# Patient Record
Sex: FEMALE | Race: WHITE | NOT HISPANIC OR LATINO | Employment: OTHER | URBAN - METROPOLITAN AREA
[De-identification: names, ages, dates, MRNs, and addresses within clinical notes are randomized per-mention and may not be internally consistent; named-entity substitution may affect disease eponyms.]

---

## 2017-08-01 ENCOUNTER — HOSPITAL ENCOUNTER (OUTPATIENT)
Dept: PHYSICAL THERAPY | Facility: CLINIC | Age: 82
Setting detail: THERAPIES SERIES
End: 2017-08-01
Attending: FAMILY MEDICINE
Payer: MEDICARE

## 2017-08-01 PROCEDURE — G8982 BODY POS GOAL STATUS: HCPCS | Mod: GP,CI | Performed by: PHYSICAL THERAPIST

## 2017-08-01 PROCEDURE — G8981 BODY POS CURRENT STATUS: HCPCS | Mod: GP,CK | Performed by: PHYSICAL THERAPIST

## 2017-08-01 PROCEDURE — 97110 THERAPEUTIC EXERCISES: CPT | Mod: GP | Performed by: PHYSICAL THERAPIST

## 2017-08-01 PROCEDURE — 40000718 ZZHC STATISTIC PT DEPARTMENT ORTHO VISIT: Performed by: PHYSICAL THERAPIST

## 2017-08-01 PROCEDURE — 97140 MANUAL THERAPY 1/> REGIONS: CPT | Mod: GP | Performed by: PHYSICAL THERAPIST

## 2017-08-01 PROCEDURE — 97162 PT EVAL MOD COMPLEX 30 MIN: CPT | Mod: GP | Performed by: PHYSICAL THERAPIST

## 2017-08-02 NOTE — PROGRESS NOTES
[facility]      OUTPATIENT PHYSICAL THERAPY ORTHOPEDIC EVALUATION  PLAN OF TREATMENT FOR OUTPATIENT REHABILITATION  (COMPLETE FOR INITIAL CLAIMS ONLY)  Patient's Last Name, First Name, Irena Solorzano       Provider s Name:  [facility]   Medical Record No.  [id number]   Start of Care Date:  08/01/17   Onset Date:  06/01/17 (Recently returned/worse, ongoing 40 yrs)   Type:     _X__PT   ___OT   ___SLP Medical Diagnosis:  (P) Neck pain, headaches     PT Diagnosis:  Cervicogenic headaches, multifactorial, reduced ROM, joint mobility   Visits from SOC:  1   Therapist assessment:   _________________________________________________________________________________  Plan of Treatment/Functional Goals:  manual therapy, joint mobilization, neuromuscular re-education, strengthening, stretching, ROM        As needed for pain relief  Goals  Goal Identifier: 1  Goal Description: Patient will improve ROM to > 50% limited all directions cervical spine to better relieve cervicogenic headache referral   Target Date: 09/26/17    Goal Identifier: 2  Goal Description: Patient will report 50 % improve tightness in head and neck to reduce headaches during ADLs in 8 weeks  Target Date: 09/26/17    Goal Identifier: 3  Goal Description: Patient will score < 20% on NDI to improve tolerance to reading, ADLs and self care activities.                Therapy Frequency:  2 times/Week  Predicted Duration of Therapy Intervention:  10 weeks    Evelyn Luna PT                 I CERTIFY THE NEED FOR THESE SERVICES FURNISHED UNDER        THIS PLAN OF TREATMENT AND WHILE UNDER MY CARE .             Physician Signature               Date    X_____________________________________________________                               Certification Date From:  08/01/17   Certification Date To:  10/11/17    Referring Provider:  Dr Enrique Tran    Initial Assessment        See Epic Evaluation Start of Care Date: 08/01/17              Evelyn Heath................... PT, DPT, CLT   8/2/2017, 7:57 AM  [phone]
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
## 2017-08-07 ENCOUNTER — HOSPITAL ENCOUNTER (OUTPATIENT)
Dept: PHYSICAL THERAPY | Facility: CLINIC | Age: 82
Setting detail: THERAPIES SERIES
End: 2017-08-07
Attending: FAMILY MEDICINE
Payer: MEDICARE

## 2017-08-07 PROCEDURE — 97140 MANUAL THERAPY 1/> REGIONS: CPT | Mod: GP | Performed by: PHYSICAL THERAPIST

## 2017-08-07 PROCEDURE — 40000718 ZZHC STATISTIC PT DEPARTMENT ORTHO VISIT: Performed by: PHYSICAL THERAPIST

## 2017-08-07 PROCEDURE — 97530 THERAPEUTIC ACTIVITIES: CPT | Mod: GP,59 | Performed by: PHYSICAL THERAPIST

## 2017-08-09 ENCOUNTER — HOSPITAL ENCOUNTER (OUTPATIENT)
Dept: PHYSICAL THERAPY | Facility: CLINIC | Age: 82
Setting detail: THERAPIES SERIES
End: 2017-08-09
Attending: FAMILY MEDICINE
Payer: MEDICARE

## 2017-08-09 PROCEDURE — 97140 MANUAL THERAPY 1/> REGIONS: CPT | Mod: GP | Performed by: PHYSICAL THERAPIST

## 2017-08-09 PROCEDURE — 97530 THERAPEUTIC ACTIVITIES: CPT | Mod: GP | Performed by: PHYSICAL THERAPIST

## 2017-08-09 PROCEDURE — 40000718 ZZHC STATISTIC PT DEPARTMENT ORTHO VISIT: Performed by: PHYSICAL THERAPIST

## 2017-08-09 PROCEDURE — 40000449 ZZHC STATISTIC PT VISIT, LYMPHEDEMA: Performed by: PHYSICAL THERAPIST

## 2017-08-15 ENCOUNTER — HOSPITAL ENCOUNTER (OUTPATIENT)
Dept: PHYSICAL THERAPY | Facility: CLINIC | Age: 82
Setting detail: THERAPIES SERIES
End: 2017-08-15
Attending: FAMILY MEDICINE
Payer: MEDICARE

## 2017-08-15 PROCEDURE — 97140 MANUAL THERAPY 1/> REGIONS: CPT | Mod: GP | Performed by: PHYSICAL THERAPIST

## 2017-08-15 PROCEDURE — 40000718 ZZHC STATISTIC PT DEPARTMENT ORTHO VISIT: Performed by: PHYSICAL THERAPIST

## 2017-08-18 ENCOUNTER — HOSPITAL ENCOUNTER (OUTPATIENT)
Dept: PHYSICAL THERAPY | Facility: CLINIC | Age: 82
Setting detail: THERAPIES SERIES
End: 2017-08-18
Attending: FAMILY MEDICINE
Payer: MEDICARE

## 2017-08-18 PROCEDURE — 97140 MANUAL THERAPY 1/> REGIONS: CPT | Mod: GP | Performed by: PHYSICAL THERAPIST

## 2017-08-18 PROCEDURE — 40000718 ZZHC STATISTIC PT DEPARTMENT ORTHO VISIT: Performed by: PHYSICAL THERAPIST

## 2017-08-21 ENCOUNTER — HOSPITAL ENCOUNTER (OUTPATIENT)
Dept: PHYSICAL THERAPY | Facility: CLINIC | Age: 82
Setting detail: THERAPIES SERIES
End: 2017-08-21
Attending: FAMILY MEDICINE
Payer: MEDICARE

## 2017-08-21 PROCEDURE — 97140 MANUAL THERAPY 1/> REGIONS: CPT | Mod: GP | Performed by: PHYSICAL THERAPIST

## 2017-08-21 PROCEDURE — 40000718 ZZHC STATISTIC PT DEPARTMENT ORTHO VISIT: Performed by: PHYSICAL THERAPIST

## 2017-08-23 ENCOUNTER — HOSPITAL ENCOUNTER (OUTPATIENT)
Dept: PHYSICAL THERAPY | Facility: CLINIC | Age: 82
Setting detail: THERAPIES SERIES
End: 2017-08-23
Attending: FAMILY MEDICINE
Payer: MEDICARE

## 2017-08-23 PROCEDURE — 40000718 ZZHC STATISTIC PT DEPARTMENT ORTHO VISIT: Performed by: PHYSICAL THERAPIST

## 2017-08-23 PROCEDURE — 97140 MANUAL THERAPY 1/> REGIONS: CPT | Mod: GP | Performed by: PHYSICAL THERAPIST

## 2017-09-12 ENCOUNTER — HOSPITAL ENCOUNTER (OUTPATIENT)
Dept: PHYSICAL THERAPY | Facility: CLINIC | Age: 82
Setting detail: THERAPIES SERIES
End: 2017-09-12
Attending: FAMILY MEDICINE
Payer: MEDICARE

## 2017-09-12 PROCEDURE — 97140 MANUAL THERAPY 1/> REGIONS: CPT | Mod: GP | Performed by: PHYSICAL THERAPIST

## 2017-09-12 PROCEDURE — 40000718 ZZHC STATISTIC PT DEPARTMENT ORTHO VISIT: Performed by: PHYSICAL THERAPIST

## 2017-09-14 ENCOUNTER — HOSPITAL ENCOUNTER (OUTPATIENT)
Dept: PHYSICAL THERAPY | Facility: CLINIC | Age: 82
Setting detail: THERAPIES SERIES
End: 2017-09-14
Attending: FAMILY MEDICINE
Payer: MEDICARE

## 2017-09-14 PROCEDURE — 97140 MANUAL THERAPY 1/> REGIONS: CPT | Mod: GP | Performed by: PHYSICAL THERAPIST

## 2017-09-14 PROCEDURE — 40000718 ZZHC STATISTIC PT DEPARTMENT ORTHO VISIT: Performed by: PHYSICAL THERAPIST

## 2017-09-19 ENCOUNTER — HOSPITAL ENCOUNTER (OUTPATIENT)
Dept: PHYSICAL THERAPY | Facility: CLINIC | Age: 82
Setting detail: THERAPIES SERIES
End: 2017-09-19
Attending: FAMILY MEDICINE
Payer: MEDICARE

## 2017-09-19 PROCEDURE — 97140 MANUAL THERAPY 1/> REGIONS: CPT | Mod: GP | Performed by: PHYSICAL THERAPIST

## 2017-09-19 PROCEDURE — G8981 BODY POS CURRENT STATUS: HCPCS | Mod: GP,CJ | Performed by: PHYSICAL THERAPIST

## 2017-09-19 PROCEDURE — 40000718 ZZHC STATISTIC PT DEPARTMENT ORTHO VISIT: Performed by: PHYSICAL THERAPIST

## 2017-09-19 PROCEDURE — G8982 BODY POS GOAL STATUS: HCPCS | Mod: GP,CI | Performed by: PHYSICAL THERAPIST

## 2017-09-19 NOTE — PROGRESS NOTES
Outpatient Physical Therapy Progress Note     Patient: Irena Hand  : 1932    Beginning/End Dates of Reporting Period:  17 to 2017    Referring Provider: Dr. Enrique Tran    Therapy Diagnosis: HA, neck pain, back pain     Client Self Report: HA #0, TL junction pain #3, has gone 4 days no HA.     Objective Measurements:        Objective Measure: NDI  Details: 15.56%         Goals:  Goal Identifier 1   Goal Description Patient will improve ROM to > 50% limited all directions cervical spine to better relieve cervicogenic headache referral  (goal met)   Target Date     Date Met  17   Progress:     Goal Identifier 2   Goal Description Patient will report 50 % improve tightness in head and neck to reduce headaches during ADLs in 8 weeks (goal met)   Target Date     Date Met  17   Progress:     Goal Identifier 3   Goal Description Patient will score < 20% on NDI to improve tolerance to reading, ADLs and self care activities.  (goal met)   Target Date     Date Met  17   Progress:     Goal Identifier 4   Goal Description Patient has gone 2 consecutive wks with no HA's so she can cont her activities without pain   Target Date 17   Date Met      Progress:                                      New goal       Progress Toward Goals:   Progress this reporting period: Patient is doing better and meeting goals. Patient has gone 4 days in a row with no HA. Was able to go to a [facility] retreat with no HA.         Plan:  Continue therapy per current plan of care.    Discharge:  No    Thank you for the referral,            Anna Amaya PT

## 2017-09-27 ENCOUNTER — HOSPITAL ENCOUNTER (OUTPATIENT)
Dept: PHYSICAL THERAPY | Facility: OTHER | Age: 82
Setting detail: THERAPIES SERIES
End: 2017-09-27
Attending: FAMILY MEDICINE
Payer: MEDICARE

## 2017-09-27 PROCEDURE — 97140 MANUAL THERAPY 1/> REGIONS: CPT | Mod: GP | Performed by: PHYSICAL THERAPIST

## 2017-09-27 PROCEDURE — 40000718 ZZHC STATISTIC PT DEPARTMENT ORTHO VISIT: Performed by: PHYSICAL THERAPIST

## 2017-10-04 ENCOUNTER — HOSPITAL ENCOUNTER (OUTPATIENT)
Dept: PHYSICAL THERAPY | Facility: OTHER | Age: 82
Setting detail: THERAPIES SERIES
End: 2017-10-04
Attending: FAMILY MEDICINE
Payer: MEDICARE

## 2017-10-04 PROCEDURE — 97140 MANUAL THERAPY 1/> REGIONS: CPT | Mod: GP | Performed by: PHYSICAL THERAPIST

## 2017-10-04 PROCEDURE — 40000718 ZZHC STATISTIC PT DEPARTMENT ORTHO VISIT: Performed by: PHYSICAL THERAPIST

## 2017-10-09 ENCOUNTER — HOSPITAL ENCOUNTER (OUTPATIENT)
Dept: PHYSICAL THERAPY | Facility: OTHER | Age: 82
Setting detail: THERAPIES SERIES
End: 2017-10-09
Attending: FAMILY MEDICINE
Payer: MEDICARE

## 2017-10-09 PROCEDURE — 97140 MANUAL THERAPY 1/> REGIONS: CPT | Mod: GP | Performed by: PHYSICAL THERAPIST

## 2017-10-09 PROCEDURE — 40000718 ZZHC STATISTIC PT DEPARTMENT ORTHO VISIT: Performed by: PHYSICAL THERAPIST

## 2017-10-13 ENCOUNTER — HOSPITAL ENCOUNTER (OUTPATIENT)
Dept: PHYSICAL THERAPY | Facility: OTHER | Age: 82
Setting detail: THERAPIES SERIES
End: 2017-10-13
Attending: FAMILY MEDICINE
Payer: MEDICARE

## 2017-10-13 PROCEDURE — 97140 MANUAL THERAPY 1/> REGIONS: CPT | Mod: GP | Performed by: PHYSICAL THERAPIST

## 2017-10-13 PROCEDURE — 40000718 ZZHC STATISTIC PT DEPARTMENT ORTHO VISIT: Performed by: PHYSICAL THERAPIST

## 2017-10-13 PROCEDURE — 97530 THERAPEUTIC ACTIVITIES: CPT | Mod: GP | Performed by: PHYSICAL THERAPIST

## 2017-10-16 ENCOUNTER — HOSPITAL ENCOUNTER (OUTPATIENT)
Dept: PHYSICAL THERAPY | Facility: OTHER | Age: 82
Setting detail: THERAPIES SERIES
End: 2017-10-16
Attending: FAMILY MEDICINE
Payer: MEDICARE

## 2017-10-16 PROCEDURE — 97140 MANUAL THERAPY 1/> REGIONS: CPT | Mod: GP | Performed by: PHYSICAL THERAPIST

## 2017-10-16 PROCEDURE — 40000718 ZZHC STATISTIC PT DEPARTMENT ORTHO VISIT: Performed by: PHYSICAL THERAPIST

## 2017-10-18 ENCOUNTER — HOSPITAL ENCOUNTER (OUTPATIENT)
Dept: PHYSICAL THERAPY | Facility: OTHER | Age: 82
Setting detail: THERAPIES SERIES
End: 2017-10-18
Attending: FAMILY MEDICINE
Payer: MEDICARE

## 2017-10-18 PROCEDURE — 40000718 ZZHC STATISTIC PT DEPARTMENT ORTHO VISIT: Performed by: PHYSICAL THERAPIST

## 2017-10-18 PROCEDURE — 97140 MANUAL THERAPY 1/> REGIONS: CPT | Mod: GP | Performed by: PHYSICAL THERAPIST

## 2017-10-18 NOTE — PROGRESS NOTES
[facility]      OUTPATIENT PHYSICAL THERAPY  PLAN OF TREATMENT FOR OUTPATIENT REHABILITATION    Patient's Last Name, First Name, M.I.                YOB: 1932  Irena Hand                           Provider's Name  [facility] Medical Record No.  [id number]                               Onset Date: 6/1/17 (ongoing 40 yrs)   Start of Care Date: 8/1/17   Type:     _X_PT   ___OT   ___SLP Medical Diagnosis: neck pain, headaches                       PT Diagnosis: Cervicogenic headaches, mutifactorial, reduced ROM, joint mobility, trunk pain      _________________________________________________________________________________  Plan of Treatment:    Frequency/Duration: 2x per wk for 10 wks     Goals:  Goal Identifier 1   Goal Description Patient will improve ROM to > 50% limited all directions cervical spine to better relieve cervicogenic headache referral  (goal met)   Target Date     Date Met  09/19/17   Progress:     Goal Identifier 2   Goal Description Patient will report 50 % improve tightness in head and neck to reduce headaches during ADLs in 8 weeks (goal met)   Target Date     Date Met  09/19/17   Progress:     Goal Identifier 3   Goal Description Patient will score < 20% on NDI to improve tolerance to reading, ADLs and self care activities.  (goal met)   Target Date     Date Met  09/19/17   Progress:     Goal Identifier 4   Goal Description Patient has gone 2 consecutive wks with no HA's so she can cont her activities without pain (goal met)   Target Date     Date Met  10/18/17   Progress:     Goal Identifier 5   Goal Description Patient no longer has pain in the right lower rib cage with all activity   Target Date 12/19/18   Date Met      Progress:                                   New goal, has happened in the past that the HA improves and the trunk pain gets worse.         Progress Toward Goals:   Progress this reporting period: Patient is doing well now with the HA pain, goals above are met. However pt states that in the past she often will get trunk pain when the HA's are better, therefore working towards pain free the whole spine and trunk.             Certification date from 10/11/17 to 12/19/17.    Anna Amaya, PT          I CERTIFY THE NEED FOR THESE SERVICES FURNISHED UNDER        THIS PLAN OF TREATMENT AND WHILE UNDER MY CARE .             Physician Signature               Date    X_____________________________________________________                        Referring Provider: Dr.David Tran

## 2017-10-24 ENCOUNTER — HOSPITAL ENCOUNTER (OUTPATIENT)
Dept: PHYSICAL THERAPY | Facility: CLINIC | Age: 82
Setting detail: THERAPIES SERIES
End: 2017-10-24
Attending: FAMILY MEDICINE
Payer: MEDICARE

## 2017-10-24 PROCEDURE — 40000718 ZZHC STATISTIC PT DEPARTMENT ORTHO VISIT: Performed by: PHYSICAL THERAPIST

## 2017-10-24 PROCEDURE — 97530 THERAPEUTIC ACTIVITIES: CPT | Mod: GP,59 | Performed by: PHYSICAL THERAPIST

## 2017-10-24 PROCEDURE — 97140 MANUAL THERAPY 1/> REGIONS: CPT | Mod: GP | Performed by: PHYSICAL THERAPIST

## 2017-10-26 ENCOUNTER — HOSPITAL ENCOUNTER (OUTPATIENT)
Dept: PHYSICAL THERAPY | Facility: CLINIC | Age: 82
Setting detail: THERAPIES SERIES
End: 2017-10-26
Attending: FAMILY MEDICINE
Payer: MEDICARE

## 2017-10-26 PROCEDURE — 97112 NEUROMUSCULAR REEDUCATION: CPT | Mod: GP | Performed by: PHYSICAL THERAPIST

## 2017-10-26 PROCEDURE — 40000718 ZZHC STATISTIC PT DEPARTMENT ORTHO VISIT: Performed by: PHYSICAL THERAPIST

## 2017-10-26 PROCEDURE — 97140 MANUAL THERAPY 1/> REGIONS: CPT | Mod: GP | Performed by: PHYSICAL THERAPIST

## 2017-10-26 NOTE — ADDENDUM NOTE
Encounter addended by: Anna Amaya PT on: 10/26/2017  8:26 AM<BR>     Actions taken: Pend clinical note

## 2017-10-31 ENCOUNTER — HOSPITAL ENCOUNTER (OUTPATIENT)
Dept: PHYSICAL THERAPY | Facility: CLINIC | Age: 82
Setting detail: THERAPIES SERIES
End: 2017-10-31
Attending: FAMILY MEDICINE
Payer: MEDICARE

## 2017-10-31 PROCEDURE — 40000718 ZZHC STATISTIC PT DEPARTMENT ORTHO VISIT: Performed by: PHYSICAL THERAPIST

## 2017-10-31 PROCEDURE — 97110 THERAPEUTIC EXERCISES: CPT | Mod: GP | Performed by: PHYSICAL THERAPIST

## 2017-11-07 ENCOUNTER — HOSPITAL ENCOUNTER (OUTPATIENT)
Dept: PHYSICAL THERAPY | Facility: CLINIC | Age: 82
Setting detail: THERAPIES SERIES
End: 2017-11-07
Attending: FAMILY MEDICINE
Payer: MEDICARE

## 2017-11-07 PROCEDURE — 97110 THERAPEUTIC EXERCISES: CPT | Mod: GP | Performed by: PHYSICAL THERAPIST

## 2017-11-07 PROCEDURE — 97140 MANUAL THERAPY 1/> REGIONS: CPT | Mod: GP | Performed by: PHYSICAL THERAPIST

## 2017-11-07 PROCEDURE — 40000718 ZZHC STATISTIC PT DEPARTMENT ORTHO VISIT: Performed by: PHYSICAL THERAPIST

## 2017-11-13 ENCOUNTER — HOSPITAL ENCOUNTER (OUTPATIENT)
Dept: PHYSICAL THERAPY | Facility: OTHER | Age: 82
Setting detail: THERAPIES SERIES
End: 2017-11-13
Attending: FAMILY MEDICINE
Payer: MEDICARE

## 2017-11-13 PROCEDURE — 97140 MANUAL THERAPY 1/> REGIONS: CPT | Mod: GP | Performed by: PHYSICAL THERAPIST

## 2017-11-13 PROCEDURE — 40000718 ZZHC STATISTIC PT DEPARTMENT ORTHO VISIT: Performed by: PHYSICAL THERAPIST

## 2017-11-15 ENCOUNTER — HOSPITAL ENCOUNTER (OUTPATIENT)
Dept: PHYSICAL THERAPY | Facility: OTHER | Age: 82
Setting detail: THERAPIES SERIES
End: 2017-11-15
Attending: FAMILY MEDICINE
Payer: MEDICARE

## 2017-11-15 PROCEDURE — 40000718 ZZHC STATISTIC PT DEPARTMENT ORTHO VISIT: Performed by: PHYSICAL THERAPIST

## 2017-11-15 PROCEDURE — 97140 MANUAL THERAPY 1/> REGIONS: CPT | Mod: GP | Performed by: PHYSICAL THERAPIST

## 2017-11-21 ENCOUNTER — HOSPITAL ENCOUNTER (OUTPATIENT)
Dept: PHYSICAL THERAPY | Facility: CLINIC | Age: 82
Setting detail: THERAPIES SERIES
End: 2017-11-21
Attending: FAMILY MEDICINE
Payer: MEDICARE

## 2017-11-21 PROCEDURE — 97140 MANUAL THERAPY 1/> REGIONS: CPT | Mod: GP,KX | Performed by: PHYSICAL THERAPIST

## 2017-11-21 PROCEDURE — G8982 BODY POS GOAL STATUS: HCPCS | Mod: GP,CI | Performed by: PHYSICAL THERAPIST

## 2017-11-21 PROCEDURE — 40000718 ZZHC STATISTIC PT DEPARTMENT ORTHO VISIT: Performed by: PHYSICAL THERAPIST

## 2017-11-21 PROCEDURE — G8983 BODY POS D/C STATUS: HCPCS | Mod: GP,CI | Performed by: PHYSICAL THERAPIST

## 2017-11-21 NOTE — PROGRESS NOTES
Outpatient Physical Therapy Discharge Note     Patient: Irena Hand  : 1932    Beginning/End Dates of Reporting Period:  17 to 2017    Referring Provider: Dr.David Tran    Therapy Diagnosis: HA, rib pain     Client Self Report: Pain in the ribs is a #2-3, never a #0 and mostly on the right. Laying on the roll 1-2x per day. NO headaches for several weeks. Patient very pleased with her results from PT.      Objective Measurements:  Objective Measure: Pain in the thoracic and ribs  Details: #2-3 but hardly feel it            Goals:  Goal Identifier 1   Goal Description  (goal met)   Target Date     Date Met  17   Progress:     Goal Identifier 2   Goal Description  (goal met)   Target Date     Date Met  17   Progress:     Goal Identifier 3   Goal Description  (goal met)   Target Date     Date Met  17   Progress:     Goal Identifier 4   Goal Description Patient has gone 2 consecutive wks with no HA's so she can cont her activities without pain (goal met)   Target Date     Date Met  10/18/17   Progress:     Goal Identifier 5   Goal Description Patient no longer has pain in the right lower rib cage with all activity (75% better)   Target Date     Date Met      Progress:                                        75% better in the rib pain       Progress Toward Goals:   Progress this reporting period: Patient is doing very well and no HA's. Rib pain is a avg of a #2 and pt is no longer taking pain meds for this.         Plan:  Discharge from therapy.    Discharge:    Reason for Discharge: Patient has met all goals.      Discharge Plan: Patient to continue home program.    Thank you for the referral,            Anan Amaya PT

## 2021-06-08 ENCOUNTER — MEDICAL CORRESPONDENCE (OUTPATIENT)
Dept: HEALTH INFORMATION MANAGEMENT | Facility: CLINIC | Age: 86
End: 2021-06-08

## 2021-06-08 ENCOUNTER — TRANSFERRED RECORDS (OUTPATIENT)
Dept: HEALTH INFORMATION MANAGEMENT | Facility: CLINIC | Age: 86
End: 2021-06-08

## 2021-06-18 ENCOUNTER — TRANSFERRED RECORDS (OUTPATIENT)
Dept: HEALTH INFORMATION MANAGEMENT | Facility: CLINIC | Age: 86
End: 2021-06-18

## 2023-01-20 ENCOUNTER — TRANSFERRED RECORDS (OUTPATIENT)
Dept: HEALTH INFORMATION MANAGEMENT | Facility: CLINIC | Age: 88
End: 2023-01-20

## 2023-01-30 ENCOUNTER — MEDICAL CORRESPONDENCE (OUTPATIENT)
Dept: HEALTH INFORMATION MANAGEMENT | Facility: CLINIC | Age: 88
End: 2023-01-30

## 2023-07-24 PROBLEM — I48.20 CHRONIC ATRIAL FIBRILLATION (H): Status: ACTIVE | Noted: 2019-06-24

## 2023-07-24 PROBLEM — R31.29 MICROSCOPIC HEMATURIA: Status: ACTIVE | Noted: 2018-06-20

## 2023-07-24 PROBLEM — M18.11 PRIMARY OSTEOARTHRITIS OF FIRST CARPOMETACARPAL JOINT OF RIGHT HAND: Status: ACTIVE | Noted: 2017-01-18

## 2023-07-24 PROBLEM — D47.2 MONOCLONAL GAMMOPATHY: Status: ACTIVE | Noted: 2022-04-27

## 2023-07-24 PROBLEM — M47.817 LUMBOSACRAL SPONDYLOSIS WITHOUT MYELOPATHY: Status: ACTIVE | Noted: 2023-07-24

## 2023-07-24 PROBLEM — Z79.01 WARFARIN ANTICOAGULATION: Status: ACTIVE | Noted: 2022-08-10

## 2023-07-24 PROBLEM — S72.001A HIP FRACTURE, RIGHT, CLOSED, INITIAL ENCOUNTER (H): Status: ACTIVE | Noted: 2023-07-18

## 2023-07-24 PROBLEM — N32.81 OVERACTIVE BLADDER: Status: ACTIVE | Noted: 2020-07-07

## 2023-07-24 PROBLEM — Z79.01 ANTICOAGULATION MONITORING, INR RANGE 2-3: Status: ACTIVE | Noted: 2021-07-09

## 2023-07-24 PROBLEM — Z79.891 LONG TERM (CURRENT) USE OF OPIATE ANALGESIC: Status: ACTIVE | Noted: 2023-07-24

## 2023-07-24 PROBLEM — D64.9 ANEMIA OF UNKNOWN ETIOLOGY: Status: ACTIVE | Noted: 2021-04-26

## 2023-07-24 PROBLEM — K43.9 VENTRAL HERNIA WITHOUT OBSTRUCTION OR GANGRENE: Status: ACTIVE | Noted: 2023-07-24

## 2023-07-24 PROBLEM — G89.29 CHRONIC PAIN: Status: ACTIVE | Noted: 2023-07-24

## 2023-07-24 PROBLEM — M47.812 CERVICAL SPONDYLOSIS WITHOUT MYELOPATHY: Status: ACTIVE | Noted: 2023-07-24

## 2023-07-24 PROBLEM — M25.569 PAIN IN UNSPECIFIED KNEE: Status: ACTIVE | Noted: 2023-07-24

## 2023-07-24 PROBLEM — F11.20 OPIOID DEPENDENCE (H): Status: ACTIVE | Noted: 2023-07-24

## 2023-07-24 PROBLEM — M54.81 OCCIPITAL NEURALGIA: Status: ACTIVE | Noted: 2023-07-24

## 2023-07-24 PROBLEM — I50.32 CHRONIC DIASTOLIC CHF (CONGESTIVE HEART FAILURE) (H): Status: ACTIVE | Noted: 2021-04-26

## 2023-07-24 PROBLEM — M47.814 THORACIC SPONDYLOSIS WITHOUT MYELOPATHY: Status: ACTIVE | Noted: 2023-07-24

## 2023-07-24 PROBLEM — H53.2 DIPLOPIA: Status: ACTIVE | Noted: 2019-03-22

## 2023-07-24 PROBLEM — M60.9 MYOSITIS: Status: ACTIVE | Noted: 2023-07-24

## 2023-07-24 PROBLEM — R22.2 SUBCUTANEOUS MASS OF ABDOMINAL WALL: Status: ACTIVE | Noted: 2023-07-24

## 2023-07-24 PROBLEM — F40.231 FEAR OF INJECTIONS AND TRANSFUSIONS: Status: ACTIVE | Noted: 2023-07-24

## 2023-07-24 PROBLEM — K21.9 GASTROESOPHAGEAL REFLUX DISEASE WITHOUT ESOPHAGITIS: Status: ACTIVE | Noted: 2017-01-02

## 2023-07-24 PROBLEM — G62.9 PERIPHERAL NEUROPATHY: Status: ACTIVE | Noted: 2023-07-24

## 2023-07-24 PROBLEM — I10 PRIMARY HYPERTENSION: Status: ACTIVE | Noted: 2022-08-10

## 2023-07-24 PROBLEM — F33.9 DEPRESSION, RECURRENT (H): Status: ACTIVE | Noted: 2022-04-27

## 2023-07-24 NOTE — PROGRESS NOTES
[facility] GERIATRICS  Primary Care Provider & Clinic: [facility] COPE, None  Chief Complaint   Patient presents with    Hospital F/U     [facility] 7/18/2023 - 7/24/2023     Reserve Medical Record Number: [id number]  Place of Service Where Encounter Took Place: [facility] (TCU) [[id number]]    Irena Hand is a 89 year old (11/18/1932), admitted to the above facility from  [facility]. Hospital stay 7/18/23 through 7/24/23.    HPI:        From EHR:  Irena Hand is a 89 y.o. female with a history of Paroxysmal atrial fibrillation (HC) on coumadin, Chronic diastolic CHF (congestive heart failure), Anemia of unknown etiology, Idiopathic peripheral neuropathy, Monoclonal gammopathy, Primary hypertension, who presented to [facility] emergency department on 7/18/2023 for right hip pain after a mechanical fall at home[place] [facility] [place] x-rays showed a right femoral neck hip fracture, chest and right elbow x-ray were negative.   Underwent Rt DAA Hip hemiarthroplasty by Dr Vallejo om 7/20/23 without immediate complication.  Hgb dropped to 7.8 on 7/23, noted hematoma on right buttock.  Hgb up to 8.6 and INR 1.4 @ discharge.   Also experienced some confusion, thought to be 2/2 opiates.    Today, in TCU, patient continues to be somewhat confused.  Difficulty answering questions, poor historian, memory impaired.  Confirmed with nursing staff that this has been her norm since admission.  Patient attests to chronic pain, pain worse in surgical extremity but able to still participate in therapies.  Denies any issues with sleep, appetite, bowels or bladder.  Denies shortness of breath, chest pain, dizziness, lightheadedness.  Note patient is on both Prilosec and Pepcid.  She is uncertain why.  Denies any known history of GI bleeding or GERD.  Acid suppression should be sufficient with PPI    CODE STATUS/ADVANCE DIRECTIVES DISCUSSION: CPR/Full code   Patient's living  condition: lives alone  ALLERGIES:   Allergies   Allergen Reactions    Cats Unknown    Perfume Unknown    Smoke. Unknown     Cigarette smoke      PAST MEDICAL HISTORY: No past medical history on file.   PAST SURGICAL HISTORY:  has no past surgical history on file.  FAMILY HISTORY: family history includes Cancer in her sister; Diabetes in her mother; Hypertension in her brother; Unknown/Adopted in her daughter and son.  SOCIAL HISTORY:  reports that she has never smoked. She has never used smokeless tobacco.    Post Discharge Medication Reconciliation Status:  MED REC REQUIRED  Post Medication Reconciliation Status: discharge medications reconciled and changed, per note/orders    Current Outpatient Medications   Medication Sig    acetaminophen (TYLENOL) 500 MG tablet Take 500 mg by mouth 2 times daily    fluticasone (FLONASE) 50 MCG/ACT nasal spray Spray 2 sprays into both nostrils daily as needed for rhinitis or allergies    gabapentin (NEURONTIN) 100 MG capsule Take 100 mg by mouth daily 100 mg at bedtime scheduled  mg once daily PRN for neuropathy    losartan (COZAAR) 25 MG tablet Take 25 mg by mouth daily    morphine (MS CONTIN) 15 MG CR tablet Take 1 tablet (15 mg) by mouth At Bedtime    omeprazole (PRILOSEC OTC) 20 MG EC tablet Take 20 mg by mouth daily    ondansetron (ZOFRAN ODT) 4 MG ODT tab Take 4 mg by mouth every 8 hours as needed for nausea or vomiting    oxyCODONE-acetaminophen (PERCOCET) 5-325 MG tablet Take 0.5 tablets by mouth every 4 hours as needed for severe pain    polyethylene glycol (MIRALAX) 17 g packet Take 17 g by mouth daily Hold for loose stools    senna-docusate (SENOKOT-S/PERICOLACE) 8.6-50 MG tablet Take 1 tablet by mouth 2 times daily    WARFARIN SODIUM PO Take by mouth See Admin Instructions Dosing in coordination with INR results     No current facility-administered medications for this visit.     ROS:  10 point ROS of systems including Constitutional, Eyes, Respiratory,  "Cardiovascular, Gastroenterology, Genitourinary, Integumentary, Musculoskeletal, Psychiatric were all negative except for pertinent positives noted in my HPI.    Vitals:  /60   Pulse 96   Temp 98.2  F (36.8  C)   Resp 16   Ht 1.549 m (5' 1\")   Wt 53.5 kg (118 lb)   SpO2 96%   BMI 22.30 kg/m    Exam:  GENERAL APPEARANCE:  Alert, in no distress, cooperative, resting comfortably in bed after slightly painful transfer into bed  RESP:  lungs clear to auscultation , no respiratory distress  CV:  regular rate and rhythm, no murmur, rub, or gallop, no significant lower extremity edema or calf tenderness  ABDOMEN:  bowel sounds normal, soft, non-tender  M/S:   Generalized weakness.  Reduced range of motion right lower extremity status post surgery  SKIN:  Surgical dressing mostly intact.  No evidence of bleeding.  Edges are curling up.  Minimal bruising  NEURO:   NFD  PSYCH:  memory impaired , anxious    Lab/Diagnostic Data:  Recent labs in [facility] reviewed by me today.       Warfarin Doses DURING Hospital Admission:   warfarin was reversed with Vitamin K on 7/18 and 7/19 for surgical procedure performed on 7/20.   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  Up to 8.4 at hospital discharge  Can contribute to some weakness  -Follow hemoglobin " for stability    (I11.0) Hypertensive heart failure (H)  Appears compensated.  Blood pressure within acceptable limits the current medical regimen is effective;  continue present plan and medications.  Losartan      (F33.9) Depression, recurrent (H)  Per medical record.  Patient denies.  Not on any medication.  Given history, monitor    (I48.20) Chronic atrial fibrillation (H)  Regular rhythm.  Heart rate ranging from 50 to the 90s.  Warfarin had been held for surgery, resumed postoperatively on 7/20.  INRs have been subtherapeutic.  INR today 1.33.  It is not clear how much Coumadin she has been given in the last few days.  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  Has not required any as needed doses since TCU admission  -Continue PTA gabapentin 100 mg at at bedtime and 1 times daily as needed    (D47.2) Monoclonal gammopathy  Per medical record.  PCP to follow at TCU discharge    Orders:   Discontinue Famotidine  Coumadin 2 mg today and tomorrow.    Recheck INR 7/28    Total time spent with patient visit at the skilled nursing facility was 35 min including patient visit and review of past records. Greater than 50% of total time spent with counseling and coordinating care due to review of medical history, medication reconciliation, collaboration and coordination of cares with facility staff.     Electronically signed by: FRIDA Cisse CNP

## 2023-07-25 ENCOUNTER — LAB REQUISITION (OUTPATIENT)
Dept: LAB | Facility: CLINIC | Age: 88
End: 2023-07-25

## 2023-07-25 DIAGNOSIS — D64.9 ANEMIA, UNSPECIFIED: ICD-10-CM

## 2023-07-25 DIAGNOSIS — G89.29 CHRONIC PAIN: Primary | ICD-10-CM

## 2023-07-25 DIAGNOSIS — I48.91 UNSPECIFIED ATRIAL FIBRILLATION (H): ICD-10-CM

## 2023-07-25 RX ORDER — MORPHINE SULFATE 15 MG/1
15 TABLET, FILM COATED, EXTENDED RELEASE ORAL AT BEDTIME
Qty: 15 TABLET | Refills: 0 | Status: SHIPPED | OUTPATIENT
Start: 2023-07-25 | End: 2023-08-04

## 2023-07-25 RX ORDER — MORPHINE SULFATE 15 MG/1
15 TABLET, FILM COATED, EXTENDED RELEASE ORAL AT BEDTIME
COMMUNITY
End: 2023-07-25

## 2023-07-26 ENCOUNTER — TRANSITIONAL CARE UNIT VISIT (OUTPATIENT)
Dept: GERIATRICS | Facility: CLINIC | Age: 88
End: 2023-07-26
Payer: MEDICARE

## 2023-07-26 VITALS
HEART RATE: 96 BPM | TEMPERATURE: 98.2 F | SYSTOLIC BLOOD PRESSURE: 130 MMHG | OXYGEN SATURATION: 96 % | WEIGHT: 118 LBS | RESPIRATION RATE: 16 BRPM | DIASTOLIC BLOOD PRESSURE: 60 MMHG | HEIGHT: 61 IN | BODY MASS INDEX: 22.28 KG/M2

## 2023-07-26 DIAGNOSIS — I48.20 CHRONIC ATRIAL FIBRILLATION (H): ICD-10-CM

## 2023-07-26 DIAGNOSIS — D47.2 MONOCLONAL GAMMOPATHY: ICD-10-CM

## 2023-07-26 DIAGNOSIS — G60.9 HEREDITARY AND IDIOPATHIC PERIPHERAL NEUROPATHY: ICD-10-CM

## 2023-07-26 DIAGNOSIS — I11.0 HYPERTENSIVE HEART FAILURE (H): ICD-10-CM

## 2023-07-26 DIAGNOSIS — S72.001D CLOSED RIGHT HIP FRACTURE, WITH ROUTINE HEALING, SUBSEQUENT ENCOUNTER: Primary | ICD-10-CM

## 2023-07-26 DIAGNOSIS — F11.29 OPIOID DEPENDENCE WITH OPIOID-INDUCED DISORDER (H): ICD-10-CM

## 2023-07-26 DIAGNOSIS — F33.9 DEPRESSION, RECURRENT (H): ICD-10-CM

## 2023-07-26 DIAGNOSIS — D64.9 POSTOPERATIVE ANEMIA: ICD-10-CM

## 2023-07-26 DIAGNOSIS — G89.29 OTHER CHRONIC PAIN: ICD-10-CM

## 2023-07-26 LAB
HGB BLD-MCNC: 8.4 G/DL (ref 11.7–15.7)
INR PPP: 1.33 (ref 0.85–1.15)

## 2023-07-26 PROCEDURE — 85018 HEMOGLOBIN: CPT | Performed by: FAMILY MEDICINE

## 2023-07-26 PROCEDURE — 85610 PROTHROMBIN TIME: CPT | Performed by: FAMILY MEDICINE

## 2023-07-26 PROCEDURE — 99310 SBSQ NF CARE HIGH MDM 45: CPT | Performed by: NURSE PRACTITIONER

## 2023-07-26 RX ORDER — ONDANSETRON 4 MG/1
4 TABLET, ORALLY DISINTEGRATING ORAL EVERY 8 HOURS PRN
COMMUNITY
Start: 2023-07-26 | End: 2023-08-08

## 2023-07-26 RX ORDER — OXYCODONE AND ACETAMINOPHEN 5; 325 MG/1; MG/1
0.5 TABLET ORAL EVERY 4 HOURS PRN
COMMUNITY
Start: 2023-07-26

## 2023-07-26 RX ORDER — ACETAMINOPHEN 500 MG
500 TABLET ORAL 2 TIMES DAILY
COMMUNITY
Start: 2023-07-26

## 2023-07-26 RX ORDER — OMEPRAZOLE 20 MG/1
20 TABLET, DELAYED RELEASE ORAL DAILY
COMMUNITY
Start: 2023-07-26

## 2023-07-26 RX ORDER — AMOXICILLIN 250 MG
1 CAPSULE ORAL 2 TIMES DAILY
COMMUNITY
Start: 2023-07-26

## 2023-07-26 RX ORDER — LOSARTAN POTASSIUM 25 MG/1
25 TABLET ORAL DAILY
COMMUNITY
Start: 2023-07-26

## 2023-07-26 RX ORDER — FLUTICASONE PROPIONATE 50 MCG
2 SPRAY, SUSPENSION (ML) NASAL DAILY PRN
COMMUNITY
Start: 2023-07-26

## 2023-07-26 RX ORDER — FAMOTIDINE 10 MG
10 TABLET ORAL DAILY
COMMUNITY
Start: 2023-07-26 | End: 2023-07-26

## 2023-07-26 RX ORDER — GABAPENTIN 100 MG/1
100 CAPSULE ORAL DAILY
COMMUNITY
Start: 2023-07-26

## 2023-07-26 RX ORDER — POLYETHYLENE GLYCOL 3350 17 G/17G
17 POWDER, FOR SOLUTION ORAL DAILY
COMMUNITY
Start: 2023-07-26

## 2023-07-26 NOTE — PROGRESS NOTES
"[facility] GERIATRICS  [place] Medical Record Number: [id number]  Place of Service where encounter took place: [facility] ([facility]) [[id number]]    HPI:    Irena Hand is a 89 year old (11/18/1932), who is being seen today for an episodic care visit at the above location. Today's concern is INR/Coumadin management for A. Fib    ROS/Subjective:  Bleeding Signs/Symptoms: None  Thromboembolic Signs/Symptoms: None  Medication Changes: No  Dietary Changes: No  Activity Changes: No  Bacterial/Viral Infection: No  Missed Coumadin Doses: None  On ASA: Yes - 81 mg po q day  Other Concerns: No    OBJECTIVE:  /77   Pulse 92   Temp 97.9  F (36.6  C)   Resp 18   Ht 1.549 m (5' 1\")   Wt 50.3 kg (110 lb 12.8 oz)   SpO2 98%   BMI 20.94 kg/m    Last INR: 1.33 on 7/26    INR Today: 1.88  Current Dose: 2mg     7/18 2.8 2 mg @ home  7/19 2.9 none- HELD for surgery  7/20 1.4 2 mg  7/21 1.3 none  7/22 1.7 1 mg  7/23 1.6 2 mg  7/24 1.4   7/26 1.33 2mg  7/27 2mg    ASSESSMENT:  (I48.20) Chronic atrial fibrillation (H)  (primary encounter diagnosis)  (Z51.81) Encounter for therapeutic drug monitoring  (Z79.01) Long term (current) use of anticoagulants  Subtherapeutic INR for goal of 2-3    PLAN/ORDERS:     New Dose: No Change    Next INR: 8/2/23      Electronically signed by: FRIDA Cisse CNP  "

## 2023-07-26 NOTE — LETTER
7/26/2023        RE: Irena Hand  [address] [place] [address] [address]  [facility] [address]        [facility] GERIATRICS  Primary Care Provider & Clinic: KALIA COPE, None  Chief Complaint   Patient presents with     Hospital F/U     [facility] 7/18/2023 - 7/24/2023     [place] Medical Record Number: [id number]  Place of Service Where Encounter Took Place: [facility] ([facility]) [[id number]]    Irena Hand is a 89 year old (11/18/1932), admitted to the above facility from  [facility]. Hospital stay 7/18/23 through 7/24/23.    HPI:        From EHR:  Irena Hand is a 89 y.o. female with a history of Paroxysmal atrial fibrillation (HC) on coumadin, Chronic diastolic CHF (congestive heart failure), Anemia of unknown etiology, Idiopathic peripheral neuropathy, Monoclonal gammopathy, Primary hypertension, who presented to [facility] emergency department on 7/18/2023 for right hip pain after a mechanical fall at home. [facility] [place]:hip x-rays showed a right femoral neck hip fracture, chest and right elbow x-ray were negative.   Underwent Rt DAA Hip hemiarthroplasty by Dr Vallejo om 7/20/23 without immediate complication.  Hgb dropped to 7.8 on 7/23, noted hematoma on right buttock.  Hgb up to 8.6 and INR 1.4 @ discharge.   Also experienced some confusion, thought to be 2/2 opiates.    Today, in TCU, patient continues to be somewhat confused.  Difficulty answering questions, poor historian, memory impaired.  Confirmed with nursing staff that this has been her norm since admission.  Patient attests to chronic pain, pain worse in surgical extremity but able to still participate in therapies.  Denies any issues with sleep, appetite, bowels or bladder.  Denies shortness of breath, chest pain, dizziness, lightheadedness.  Note patient is on both Prilosec and Pepcid.  She is uncertain why.  Denies any known history of GI bleeding or GERD.  Acid suppression should be  sufficient with PPI    CODE STATUS/ADVANCE DIRECTIVES DISCUSSION: CPR/Full code   Patient's living condition: lives alone  ALLERGIES:   Allergies   Allergen Reactions     Cats Unknown     Perfume Unknown     Smoke. Unknown     Cigarette smoke      PAST MEDICAL HISTORY: No past medical history on file.   PAST SURGICAL HISTORY:  has no past surgical history on file.  FAMILY HISTORY: family history includes Cancer in her sister; Diabetes in her mother; Hypertension in her brother; Unknown/Adopted in her daughter and son.  SOCIAL HISTORY:  reports that she has never smoked. She has never used smokeless tobacco.    Post Discharge Medication Reconciliation Status:  MED REC REQUIRED  Post Medication Reconciliation Status: discharge medications reconciled and changed, per note/orders    Current Outpatient Medications   Medication Sig     acetaminophen (TYLENOL) 500 MG tablet Take 500 mg by mouth 2 times daily     fluticasone (FLONASE) 50 MCG/ACT nasal spray Spray 2 sprays into both nostrils daily as needed for rhinitis or allergies     gabapentin (NEURONTIN) 100 MG capsule Take 100 mg by mouth daily 100 mg at bedtime scheduled  mg once daily PRN for neuropathy     losartan (COZAAR) 25 MG tablet Take 25 mg by mouth daily     morphine (MS CONTIN) 15 MG CR tablet Take 1 tablet (15 mg) by mouth At Bedtime     omeprazole (PRILOSEC OTC) 20 MG EC tablet Take 20 mg by mouth daily     ondansetron (ZOFRAN ODT) 4 MG ODT tab Take 4 mg by mouth every 8 hours as needed for nausea or vomiting     oxyCODONE-acetaminophen (PERCOCET) 5-325 MG tablet Take 0.5 tablets by mouth every 4 hours as needed for severe pain     polyethylene glycol (MIRALAX) 17 g packet Take 17 g by mouth daily Hold for loose stools     senna-docusate (SENOKOT-S/PERICOLACE) 8.6-50 MG tablet Take 1 tablet by mouth 2 times daily     WARFARIN SODIUM PO Take by mouth See Admin Instructions Dosing in coordination with INR results     No current facility-administered  "medications for this visit.     ROS:  10 point ROS of systems including Constitutional, Eyes, Respiratory, Cardiovascular, Gastroenterology, Genitourinary, Integumentary, Musculoskeletal, Psychiatric were all negative except for pertinent positives noted in my HPI.    Vitals:  /60   Pulse 96   Temp 98.2  F (36.8  C)   Resp 16   Ht 1.549 m (5' 1\")   Wt 53.5 kg (118 lb)   SpO2 96%   BMI 22.30 kg/m    Exam:  GENERAL APPEARANCE:  Alert, in no distress, cooperative, resting comfortably in bed after slightly painful transfer into bed  RESP:  lungs clear to auscultation , no respiratory distress  CV:  regular rate and rhythm, no murmur, rub, or gallop, no significant lower extremity edema or calf tenderness  ABDOMEN:  bowel sounds normal, soft, non-tender  M/S:   Generalized weakness.  Reduced range of motion right lower extremity status post surgery  SKIN:  Surgical dressing mostly intact.  No evidence of bleeding.  Edges are curling up.  Minimal bruising  NEURO:   NFD  PSYCH:  memory impaired , anxious    Lab/Diagnostic Data:  Recent labs in [facility] reviewed by me today.       Warfarin Doses DURING Hospital Admission:   warfarin was reversed with Vitamin K on 7/18 and 7/19 for surgical procedure performed on 7/20.   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  Up to 8.4 at hospital discharge  Can contribute to some weakness  -Follow hemoglobin for stability    (I11.0) Hypertensive heart failure (H)  Appears compensated.  Blood pressure within acceptable limits the current medical regimen is effective;  continue present plan and medications.  Losartan      (F33.9) Depression, recurrent (H)  Per medical record.  Patient denies.  Not on any medication.  Given history, monitor    (I48.20) Chronic atrial fibrillation (H)  Regular rhythm.  Heart rate ranging from 50 to the 90s.  Warfarin had been held for surgery, resumed postoperatively on 7/20.  INRs have been subtherapeutic.  INR today 1.33.  It is not clear how much Coumadin she has been given in the last few days.  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  Has not required any as needed doses since TCU admission  -Continue PTA gabapentin 100 mg at at bedtime and 1 times daily as needed    (D47.2) Monoclonal gammopathy  Per medical record.  PCP to follow at TCU discharge    Orders:   Discontinue Famotidine  Coumadin 2 mg today and tomorrow.    Recheck INR 7/28    Total time spent with patient visit at the skilled nursing facility was 35 min including patient visit and review of past records. Greater than 50% of total time spent with counseling and coordinating care due to review of medical history, medication reconciliation, collaboration and coordination of cares with facility staff.     Electronically signed by: FRIDA Cisse CNP      Sincerely,        FRIDA Cisse CNP

## 2023-07-27 ENCOUNTER — LAB REQUISITION (OUTPATIENT)
Dept: LAB | Facility: CLINIC | Age: 88
End: 2023-07-27

## 2023-07-27 DIAGNOSIS — I48.91 UNSPECIFIED ATRIAL FIBRILLATION (H): ICD-10-CM

## 2023-07-28 ENCOUNTER — TRANSITIONAL CARE UNIT VISIT (OUTPATIENT)
Dept: GERIATRICS | Facility: CLINIC | Age: 88
End: 2023-07-28
Payer: MEDICARE

## 2023-07-28 VITALS
SYSTOLIC BLOOD PRESSURE: 120 MMHG | RESPIRATION RATE: 18 BRPM | TEMPERATURE: 97.9 F | HEART RATE: 92 BPM | OXYGEN SATURATION: 98 % | WEIGHT: 110.8 LBS | HEIGHT: 61 IN | DIASTOLIC BLOOD PRESSURE: 77 MMHG | BODY MASS INDEX: 20.92 KG/M2

## 2023-07-28 DIAGNOSIS — Z79.01 LONG TERM (CURRENT) USE OF ANTICOAGULANTS: ICD-10-CM

## 2023-07-28 DIAGNOSIS — Z51.81 ENCOUNTER FOR THERAPEUTIC DRUG MONITORING: ICD-10-CM

## 2023-07-28 DIAGNOSIS — I48.20 CHRONIC ATRIAL FIBRILLATION (H): Primary | ICD-10-CM

## 2023-07-28 LAB — INR PPP: 1.88 (ref 0.85–1.15)

## 2023-07-28 PROCEDURE — 85610 PROTHROMBIN TIME: CPT | Performed by: NURSE PRACTITIONER

## 2023-07-28 PROCEDURE — 36415 COLL VENOUS BLD VENIPUNCTURE: CPT | Performed by: NURSE PRACTITIONER

## 2023-07-28 PROCEDURE — P9603 ONE-WAY ALLOW PRORATED MILES: HCPCS | Performed by: NURSE PRACTITIONER

## 2023-07-28 PROCEDURE — 99308 SBSQ NF CARE LOW MDM 20: CPT | Performed by: NURSE PRACTITIONER

## 2023-07-28 NOTE — LETTER
"    7/28/2023        RE: Irena Hand  [address]  [facility] [address]        [facility]  [place] Medical Record Number: [id number]  Place of Service where encounter took place: [facility] ([facility]) [[id number]]    HPI:    Irena Hand is a 89 year old (11/18/1932), who is being seen today for an episodic care visit at the above location. Today's concern is INR/Coumadin management for A. Fib    ROS/Subjective:  Bleeding Signs/Symptoms: None  Thromboembolic Signs/Symptoms: None  Medication Changes: No  Dietary Changes: No  Activity Changes: No  Bacterial/Viral Infection: No  Missed Coumadin Doses: None  On ASA: Yes - 81 mg po q day  Other Concerns: No    OBJECTIVE:  /77   Pulse 92   Temp 97.9  F (36.6  C)   Resp 18   Ht 1.549 m (5' 1\")   Wt 50.3 kg (110 lb 12.8 oz)   SpO2 98%   BMI 20.94 kg/m    Last INR: 1.33 on 7/26    INR Today: 1.88  Current Dose: 2mg     7/18 2.8 2 mg @ home  7/19 2.9 none- HELD for surgery  7/20 1.4 2 mg  7/21 1.3 none  7/22 1.7 1 mg  7/23 1.6 2 mg  7/24 1.4   7/26 1.33 2mg  7/27 2mg    ASSESSMENT:  (I48.20) Chronic atrial fibrillation (H)  (primary encounter diagnosis)  (Z51.81) Encounter for therapeutic drug monitoring  (Z79.01) Long term (current) use of anticoagulants  Subtherapeutic INR for goal of 2-3    PLAN/ORDERS:     New Dose: No Change    Next INR: 8/2/23      Electronically signed by: FRIDA Cisse CNP      Sincerely,        FRIDA Cisse CNP      "

## 2023-08-01 ENCOUNTER — LAB REQUISITION (OUTPATIENT)
Dept: LAB | Facility: CLINIC | Age: 88
End: 2023-08-01

## 2023-08-01 DIAGNOSIS — I48.91 UNSPECIFIED ATRIAL FIBRILLATION (H): ICD-10-CM

## 2023-08-01 NOTE — PROGRESS NOTES
"[place] GERIATRIC SERVICES  PRIMARY CARE PROVIDER AND CLINIC:  KALIA COPE, NO INFO AVAILABLE 7/26/23  Chief Complaint   Patient presents with     Hospital F/U     [place] Medical Record Number:  [id number]  Place of Service where encounter took place:  [facility] ([facility]) [[id number]]    Irena Hand  is a 89 year old  (11/18/1932), admitted to the above facility from  [facility]. Hospital stay 7/18/23 through 7/24/23..  Admitted to this facility for  rehab, medical management, and nursing care.    HPI:    HPI information obtained from: facility chart records, facility staff, patient report and [facility] chart review.   Brief Summary of Hospital Course:    as per DNP:\"Irena Hand is a 89 y.o. female with a history of Paroxysmal atrial fibrillation (HC) on coumadin, Chronic diastolic CHF (congestive heart failure), Anemia of unknown etiology, Idiopathic peripheral neuropathy, Monoclonal gammopathy, Primary hypertension, who presented to [facility] emergency department on 7/18/2023 for right hip pain after a [place] fall at [place] [facility] [place] x-rays showed a right femoral neck hip fracture, chest and right elbow x-ray were negative.   Underwent Rt DAA Hip hemiarthroplasty by Dr Vallejo om 7/20/23 without immediate complication.  Hgb dropped to 7.8 on 7/23, noted hematoma on right buttock.  Hgb up to 8.6 and INR 1.4 @ discharge.   Also experienced some confusion, thought to be 2/2 opiates.\"      TODAY:  - Rt femoral neck fx: denies pain  - Anemia: no dizziness or fainting  - Heart: denies CP or SOB>     ========================================================================    CODE STATUS/ADVANCE DIRECTIVES DISCUSSION:   CPR/Full code   Patient's living condition: lives alone  ALLERGIES: Cats, Perfume, and Smoke.  PAST MEDICAL HISTORY:  has no past medical history on file.  PAST SURGICAL HISTORY:   has no past surgical history on file.  FAMILY HISTORY: family " "history includes Cancer in her sister; Diabetes in her mother; Hypertension in her brother; Unknown/Adopted in her daughter and son.  SOCIAL HISTORY:   reports that she has never smoked. She has never used smokeless tobacco.    Post Discharge Medication Reconciliation Status: discharge medications reconciled and changed, per note/orders  Current Outpatient Medications   Medication Sig Dispense Refill     acetaminophen (TYLENOL) 500 MG tablet Take 500 mg by mouth 2 times daily       fluticasone (FLONASE) 50 MCG/ACT nasal spray Spray 2 sprays into both nostrils daily as needed for rhinitis or allergies       gabapentin (NEURONTIN) 100 MG capsule Take 100 mg by mouth daily 100 mg at bedtime scheduled  mg once daily PRN for neuropathy       losartan (COZAAR) 25 MG tablet Take 25 mg by mouth daily       morphine (MS CONTIN) 15 MG CR tablet Take 1 tablet (15 mg) by mouth At Bedtime 15 tablet 0     omeprazole (PRILOSEC OTC) 20 MG EC tablet Take 20 mg by mouth daily       ondansetron (ZOFRAN ODT) 4 MG ODT tab Take 4 mg by mouth every 8 hours as needed for nausea or vomiting       oxyCODONE-acetaminophen (PERCOCET) 5-325 MG tablet Take 0.5 tablets by mouth every 4 hours as needed for severe pain       polyethylene glycol (MIRALAX) 17 g packet Take 17 g by mouth daily Hold for loose stools       senna-docusate (SENOKOT-S/PERICOLACE) 8.6-50 MG tablet Take 1 tablet by mouth 2 times daily       WARFARIN SODIUM PO Take by mouth See Admin Instructions Dosing in coordination with INR results       ROS:  10 point ROS of systems including Constitutional, Eyes, Respiratory, Cardiovascular, Gastroenterology, Genitourinary, Integumentary, Musculoskeletal, Psychiatric were all negative except for pertinent positives noted in my HPI.    Vitals:  /60   Pulse 102   Temp 97.6  F (36.4  C)   Resp 16   Ht 1.549 m (5' 1\")   Wt 49.8 kg (109 lb 12.8 oz)   SpO2 96%   BMI 20.75 kg/m    Exam:  GENERAL APPEARANCE:  in no distress, "   RESP:  Unlabored breathing. CTA b/l.   CV:  S1S2 audible, regular HR, no murmur appreciated.   ABDOMEN:  soft, NT/ND, BS audible.   M/S:   Traces pedal edema right side  SKIN:  No rash noted on observation  NEURO:   No NFD appreciated on observation.   PSYCH:  affect and mood normal    Lab/Diagnostic data: Reviewed in the chart and EHR.        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 Continue present plan and medications.  - Followed by Orthopedic Team. Follow on the recommendations / instructions.   - DVT prophylaxis according to Orthopedic team recommendations  - Started rehab program, making a progress, continue until desired goal is achieved.       (I48.20) Chronic atrial fibrillation (H)  (I11.0) Hypertensive heart failure (H)  (Z79.01) Long term (current) use of anticoagulants  -cardiac wise compensated. CVR   - continue current poc.   - cardiology routine follow up  - - last INR was 1.88 (7/28) on 2 mg daily.  INR today 2.16.  coumadin 1 mg tonight, then 2 mg on 8/3, and recheck INR on 8/4.       (F33.9) Depression, recurrent (H)  - adjusting well. continue current regimen.     (D64.9) Postoperative anemia  -  Clinically stable.       Electronically signed by:  Anshul Lucas MD

## 2023-08-02 ENCOUNTER — TELEPHONE (OUTPATIENT)
Dept: GERIATRICS | Facility: CLINIC | Age: 88
End: 2023-08-02

## 2023-08-02 ENCOUNTER — TRANSITIONAL CARE UNIT VISIT (OUTPATIENT)
Dept: GERIATRICS | Facility: CLINIC | Age: 88
End: 2023-08-02
Payer: MEDICARE

## 2023-08-02 VITALS
SYSTOLIC BLOOD PRESSURE: 117 MMHG | OXYGEN SATURATION: 96 % | HEIGHT: 61 IN | TEMPERATURE: 97.6 F | DIASTOLIC BLOOD PRESSURE: 60 MMHG | RESPIRATION RATE: 16 BRPM | WEIGHT: 109.8 LBS | BODY MASS INDEX: 20.73 KG/M2 | HEART RATE: 102 BPM

## 2023-08-02 DIAGNOSIS — I11.0 HYPERTENSIVE HEART FAILURE (H): ICD-10-CM

## 2023-08-02 DIAGNOSIS — Z79.01 LONG TERM (CURRENT) USE OF ANTICOAGULANTS: ICD-10-CM

## 2023-08-02 DIAGNOSIS — F33.9 DEPRESSION, RECURRENT (H): ICD-10-CM

## 2023-08-02 DIAGNOSIS — Z51.81 ENCOUNTER FOR THERAPEUTIC DRUG MONITORING: ICD-10-CM

## 2023-08-02 DIAGNOSIS — G89.29 OTHER CHRONIC PAIN: ICD-10-CM

## 2023-08-02 DIAGNOSIS — F11.29 OPIOID DEPENDENCE WITH OPIOID-INDUCED DISORDER (H): ICD-10-CM

## 2023-08-02 DIAGNOSIS — I48.20 CHRONIC ATRIAL FIBRILLATION (H): ICD-10-CM

## 2023-08-02 DIAGNOSIS — S72.001D CLOSED RIGHT HIP FRACTURE, WITH ROUTINE HEALING, SUBSEQUENT ENCOUNTER: Primary | ICD-10-CM

## 2023-08-02 DIAGNOSIS — D64.9 POSTOPERATIVE ANEMIA: ICD-10-CM

## 2023-08-02 LAB — INR PPP: 2.16 (ref 0.85–1.15)

## 2023-08-02 PROCEDURE — 85610 PROTHROMBIN TIME: CPT | Performed by: NURSE PRACTITIONER

## 2023-08-02 PROCEDURE — P9603 ONE-WAY ALLOW PRORATED MILES: HCPCS | Performed by: NURSE PRACTITIONER

## 2023-08-02 PROCEDURE — 99305 1ST NF CARE MODERATE MDM 35: CPT | Performed by: FAMILY MEDICINE

## 2023-08-02 PROCEDURE — 36415 COLL VENOUS BLD VENIPUNCTURE: CPT | Performed by: NURSE PRACTITIONER

## 2023-08-02 NOTE — LETTER
"    8/2/2023        RE: Irena Hand  [address]  [facility] [address]        [place] GERIATRIC SERVICES  PRIMARY CARE PROVIDER AND CLINIC:  KALIA COPE, NO INFO AVAILABLE 7/26/23  Chief Complaint   Patient presents with     Hospital F/U     [place] Medical Record Number:  [id number]  Place of Service where encounter took place:  [facility] ([facility]) [[id number]]    Irena Hand  is a 89 year old  (11/18/1932), admitted to the above facility from  [facility]. Hospital stay 7/18/23 through 7/24/23..  Admitted to this facility for  rehab, medical management, and nursing care.    HPI:    HPI information obtained from: facility chart records, facility staff, patient report and [facility] chart review.   Brief Summary of Hospital Course:    as per DNP:\"Irena Hand is a 89 y.o. female with a history of Paroxysmal atrial fibrillation (HC) on coumadin, Chronic diastolic CHF (congestive heart failure), Anemia of unknown etiology, Idiopathic peripheral neuropathy, Monoclonal gammopathy, Primary hypertension, who presented to [facility] emergency department on 7/18/2023 for right hip pain after a mechanical fall at home[place] [facility] [place] x-rays showed a right femoral neck hip fracture, chest and right elbow x-ray were negative.   Underwent Rt DAA Hip hemiarthroplasty by Dr Vallejo om 7/20/23 without immediate complication.  Hgb dropped to 7.8 on 7/23, noted hematoma on right buttock.  Hgb up to 8.6 and INR 1.4 @ discharge.   Also experienced some confusion, thought to be 2/2 opiates.\"      TODAY:  - Rt femoral neck fx: denies pain  - Anemia: no dizziness or fainting  - Heart: denies CP or SOB>     ========================================================================    CODE STATUS/ADVANCE DIRECTIVES DISCUSSION:   CPR/Full code   Patient's living condition: lives alone  ALLERGIES: Cats, Perfume, and Smoke.  PAST MEDICAL HISTORY:  has no past medical history on " "file.  PAST SURGICAL HISTORY:   has no past surgical history on file.  FAMILY HISTORY: family history includes Cancer in her sister; Diabetes in her mother; Hypertension in her brother; Unknown/Adopted in her daughter and son.  SOCIAL HISTORY:   reports that she has never smoked. She has never used smokeless tobacco.    Post Discharge Medication Reconciliation Status: discharge medications reconciled and changed, per note/orders  Current Outpatient Medications   Medication Sig Dispense Refill     acetaminophen (TYLENOL) 500 MG tablet Take 500 mg by mouth 2 times daily       fluticasone (FLONASE) 50 MCG/ACT nasal spray Spray 2 sprays into both nostrils daily as needed for rhinitis or allergies       gabapentin (NEURONTIN) 100 MG capsule Take 100 mg by mouth daily 100 mg at bedtime scheduled  mg once daily PRN for neuropathy       losartan (COZAAR) 25 MG tablet Take 25 mg by mouth daily       morphine (MS CONTIN) 15 MG CR tablet Take 1 tablet (15 mg) by mouth At Bedtime 15 tablet 0     omeprazole (PRILOSEC OTC) 20 MG EC tablet Take 20 mg by mouth daily       ondansetron (ZOFRAN ODT) 4 MG ODT tab Take 4 mg by mouth every 8 hours as needed for nausea or vomiting       oxyCODONE-acetaminophen (PERCOCET) 5-325 MG tablet Take 0.5 tablets by mouth every 4 hours as needed for severe pain       polyethylene glycol (MIRALAX) 17 g packet Take 17 g by mouth daily Hold for loose stools       senna-docusate (SENOKOT-S/PERICOLACE) 8.6-50 MG tablet Take 1 tablet by mouth 2 times daily       WARFARIN SODIUM PO Take by mouth See Admin Instructions Dosing in coordination with INR results       ROS:  10 point ROS of systems including Constitutional, Eyes, Respiratory, Cardiovascular, Gastroenterology, Genitourinary, Integumentary, Musculoskeletal, Psychiatric were all negative except for pertinent positives noted in my HPI.    Vitals:  /60   Pulse 102   Temp 97.6  F (36.4  C)   Resp 16   Ht 1.549 m (5' 1\")   Wt 49.8 kg " (109 lb 12.8 oz)   SpO2 96%   BMI 20.75 kg/m    Exam:  GENERAL APPEARANCE:  in no distress,   RESP:  Unlabored breathing. CTA b/l.   CV:  S1S2 audible, regular HR, no murmur appreciated.   ABDOMEN:  soft, NT/ND, BS audible.   M/S:   Traces pedal edema right side  SKIN:  No rash noted on observation  NEURO:   No NFD appreciated on observation.   PSYCH:  affect and mood normal    Lab/Diagnostic data: Reviewed in the chart and EHR.        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 Continue present plan and medications.  - Followed by Orthopedic Team. Follow on the recommendations / instructions.   - DVT prophylaxis according to Orthopedic team recommendations  - Started rehab program, making a progress, continue until desired goal is achieved.       (I48.20) Chronic atrial fibrillation (H)  (I11.0) Hypertensive heart failure (H)  (Z79.01) Long term (current) use of anticoagulants  -cardiac wise compensated. CVR   - continue current poc.   - cardiology routine follow up  - - last INR was 1.88 (7/28) on 2 mg daily.  INR today 2.16.  coumadin 1 mg tonight, then 2 mg on 8/3, and recheck INR on 8/4.       (F33.9) Depression, recurrent (H)  - adjusting well. continue current regimen.     (D64.9) Postoperative anemia  -  Clinically stable.       Electronically signed by:  Anshul Lucas MD                           Sincerely,        Anshul Lucas MD

## 2023-08-02 NOTE — TELEPHONE ENCOUNTER
[facility] Geriatrics Triage Nurse INR     Provider: FRIDA Saab CNP  Facility: [facility]  Facility Type:  TCU    Caller: Li  Call Back Number: [phone]  Reason for call: INR  Diagnosis/Goal: A. Fib    Todays INR: 2.16  Last INR 7/26  1.33  2mg every day       Heparin/Lovenox:  No  Currently on ABX?: No  Other interacting medication:  None  Missed or refused doses: No    Verbal Order/Direction given by Provider:   coumadin PO 1mg today, 2mg on 8/3 and recheck INR on 8/4.     Provider Giving Order:  FRIDA Saab CNP    Verbal Order given to: Li Milligan RN

## 2023-08-03 ENCOUNTER — LAB REQUISITION (OUTPATIENT)
Dept: LAB | Facility: CLINIC | Age: 88
End: 2023-08-03

## 2023-08-03 DIAGNOSIS — I48.91 UNSPECIFIED ATRIAL FIBRILLATION (H): ICD-10-CM

## 2023-08-03 NOTE — PROGRESS NOTES
"[facility] GERIATRICS  [place] Medical Record Number: [id number]  Place of Service where encounter took place: [facility] ([facility]) [[id number]]    HPI:    Irena Hand is a 89 year old (11/18/1932), who is being seen today for an episodic care visit at the above location. Today's concern is INR/Coumadin management for A. Fib    ROS/Subjective:  Bleeding Signs/Symptoms: None  Thromboembolic Signs/Symptoms: None  Medication Changes: No  Dietary Changes: No  Activity Changes: No  Bacterial/Viral Infection: No  Missed Coumadin Doses: None  On ASA: No  Other Concerns: No    OBJECTIVE:  /67   Pulse 104   Temp 98.5  F (36.9  C)   Resp 16   Ht 1.549 m (5' 1\")   Wt 49.3 kg (108 lb 11.2 oz)   SpO2 97%   BMI 20.54 kg/m    Last INR: 2.16 on 8/2  INR Today: 2.37  Current Dose: 1mg on 8/2, 2mg on 8/3.       ASSESSMENT:  1. Chronic atrial fibrillation (H)    2. Long term (current) use of anticoagulants    3. Encounter for therapeutic drug monitoring    4. Chronic pain syndrome      Therapeutic INR for goal of 2-3    PLAN/ORDERS:     New Dose: 2mg on 8/4 and 8/6, 1mg on 8/5.    Next INR: 8/7      Electronically signed by: FRIDA An CNP  "

## 2023-08-04 ENCOUNTER — TRANSITIONAL CARE UNIT VISIT (OUTPATIENT)
Dept: GERIATRICS | Facility: CLINIC | Age: 88
End: 2023-08-04
Payer: MEDICARE

## 2023-08-04 VITALS
HEIGHT: 61 IN | DIASTOLIC BLOOD PRESSURE: 67 MMHG | SYSTOLIC BLOOD PRESSURE: 138 MMHG | OXYGEN SATURATION: 97 % | HEART RATE: 104 BPM | BODY MASS INDEX: 20.52 KG/M2 | TEMPERATURE: 98.5 F | WEIGHT: 108.7 LBS | RESPIRATION RATE: 16 BRPM

## 2023-08-04 DIAGNOSIS — G89.4 CHRONIC PAIN SYNDROME: ICD-10-CM

## 2023-08-04 DIAGNOSIS — I48.20 CHRONIC ATRIAL FIBRILLATION (H): Primary | ICD-10-CM

## 2023-08-04 DIAGNOSIS — Z79.01 LONG TERM (CURRENT) USE OF ANTICOAGULANTS: ICD-10-CM

## 2023-08-04 DIAGNOSIS — Z51.81 ENCOUNTER FOR THERAPEUTIC DRUG MONITORING: ICD-10-CM

## 2023-08-04 LAB — INR PPP: 2.37 (ref 0.85–1.15)

## 2023-08-04 PROCEDURE — P9603 ONE-WAY ALLOW PRORATED MILES: HCPCS | Performed by: NURSE PRACTITIONER

## 2023-08-04 PROCEDURE — 36415 COLL VENOUS BLD VENIPUNCTURE: CPT | Performed by: NURSE PRACTITIONER

## 2023-08-04 PROCEDURE — 99308 SBSQ NF CARE LOW MDM 20: CPT | Performed by: NURSE PRACTITIONER

## 2023-08-04 PROCEDURE — 85610 PROTHROMBIN TIME: CPT | Performed by: NURSE PRACTITIONER

## 2023-08-04 RX ORDER — MORPHINE SULFATE 15 MG/1
15 TABLET, FILM COATED, EXTENDED RELEASE ORAL AT BEDTIME
Qty: 15 TABLET | Refills: 0 | Status: SHIPPED | OUTPATIENT
Start: 2023-08-04

## 2023-08-04 NOTE — LETTER
"    8/4/2023        RE: Irena Hand  [address]  [facility] [address]        [facility]  [place] Medical Record Number: [id number]  Place of Service where encounter took place: [facility] ([facility]) [[id number]]    HPI:    Irena Hand is a 89 year old (11/18/1932), who is being seen today for an episodic care visit at the above location. Today's concern is INR/Coumadin management for A. Fib    ROS/Subjective:  Bleeding Signs/Symptoms: None  Thromboembolic Signs/Symptoms: None  Medication Changes: No  Dietary Changes: No  Activity Changes: No  Bacterial/Viral Infection: No  Missed Coumadin Doses: None  On ASA: No  Other Concerns: No    OBJECTIVE:  /67   Pulse 104   Temp 98.5  F (36.9  C)   Resp 16   Ht 1.549 m (5' 1\")   Wt 49.3 kg (108 lb 11.2 oz)   SpO2 97%   BMI 20.54 kg/m    Last INR: 2.16 on 8/2  INR Today: 2.37  Current Dose: 1mg on 8/2, 2mg on 8/3.       ASSESSMENT:  1. Chronic atrial fibrillation (H)    2. Long term (current) use of anticoagulants    3. Encounter for therapeutic drug monitoring    4. Chronic pain syndrome      Therapeutic INR for goal of 2-3    PLAN/ORDERS:     New Dose: 2mg on 8/4 and 8/6, 1mg on 8/5.    Next INR: 8/7      Electronically signed by: FRIDA An CNP      Sincerely,        FRIDA An CNP      "

## 2023-08-06 ENCOUNTER — LAB REQUISITION (OUTPATIENT)
Dept: LAB | Facility: CLINIC | Age: 88
End: 2023-08-06

## 2023-08-06 DIAGNOSIS — I48.91 UNSPECIFIED ATRIAL FIBRILLATION (H): ICD-10-CM

## 2023-08-07 ENCOUNTER — TELEPHONE (OUTPATIENT)
Dept: GERIATRICS | Facility: CLINIC | Age: 88
End: 2023-08-07
Payer: MEDICARE

## 2023-08-07 LAB — INR PPP: 2.34 (ref 0.85–1.15)

## 2023-08-07 PROCEDURE — P9603 ONE-WAY ALLOW PRORATED MILES: HCPCS | Performed by: FAMILY MEDICINE

## 2023-08-07 PROCEDURE — 85610 PROTHROMBIN TIME: CPT | Performed by: FAMILY MEDICINE

## 2023-08-07 PROCEDURE — 36415 COLL VENOUS BLD VENIPUNCTURE: CPT | Performed by: FAMILY MEDICINE

## 2023-08-07 NOTE — PROGRESS NOTES
[facility] GERIATRICS DISCHARGE SUMMARY  Patient Name: Irena Hand  YOB: 1932  [place] Medical Record Number: [id number]  Place of Service Where Encounter Took Place: [facility] ([facility]) [[id number]]    PRIMARY CARE PROVIDER AND CLINIC RESPONSIBLE AFTER TRANSFER: Erica Olson MD, [address] [facility] [address]; Non-FMG Provider     Transferring providers: FRIDA Saab CNP; Anshul Lucas MD  Recent Hospitalization/ED: Hospital  [facility] stay 7/18/23 to 7/24/23.  Date of SNF Admission: July 24, 2023  Date of SNF (anticipated) Discharge: August 10, 2023  Discharged to: previous assisted living  Cognitive Scores: SLUMS: 12/30, CPT: 3.8/5.6, and ACL: 4.2/5.8  Physical Function: Ambulating 300 ft with SBA  DME: Walker and DME F2F done 8/8    CODE STATUS/ADVANCE DIRECTIVES DISCUSSION: Full Code   ALLERGIES: Cats, Perfume, and Smoke.    NURSING FACILITY COURSE:  Medication Changes/Rationale:   Stopped zofran an no longer needed    Summary of nursing facility stay:   From NP note on 7/26: Irena Hand is a 89 y.o. female with a history of Paroxysmal atrial fibrillation (HC) on coumadin, Chronic diastolic CHF (congestive heart failure), Anemia of unknown etiology, Idiopathic peripheral neuropathy, Monoclonal gammopathy, Primary hypertension, who presented to [facility] emergency department on 7/18/2023 for right hip pain after a mechanical fall at home. [facility] ED evaluation:hip x-rays showed a right femoral neck hip fracture, chest and right elbow x-ray were negative.   Underwent Rt DAA Hip hemiarthroplasty by Dr Vallejo om 7/20/23 without immediate complication.  Hgb dropped to 7.8 on 7/23, noted hematoma on right buttock.  Hgb up to 8.6 and INR 1.4 @ discharge.   Also experienced some confusion, thought to be 2/2 opiates.    Closed right hip fracture, with routine healing, subsequent encounter  Chronic pain syndrome  Opioid dependence with  opioid-induced disorder (H)  Physical deconditioning  Secondary to mechanical fall. Pain controlled. Made progress with PT and OT. Had follow-up with ortho today, healing well. She is discharging to [facility].   - Walker Order  - analgesia with oxycodone/APAP 1/2 tab q4h PRN, APAP 500mg BID, MS Contin 15mg at bedtime,   - follow-up with ortho in 6 weeks  - follow-up with outpatient PT/OT    Chronic atrial fibrillation (H)  HR controlled. Anticoagulated on coumadin, most recent INR 2.34  - coumadin as ordered  - will check INR on 8/11 prior to discharge    Hypertensive heart failure (H)  BP controlled 110-140  - continue losartan    Depression, recurrent (H)  Mood stable  - monitor     Postoperative anemia  Hgb 7.8 post-op, did not receive transfusion. Hgb stable at 8.4 in TCU    Hereditary and idiopathic peripheral neuropathy  Controlled  - gabapentin 100mg at bedtime and 100mg daily PRN      Discharge Medications:  MED REC REQUIRED  Post Medication Reconciliation Status:  Medication reconciliation previously completed during another office visit    Current Outpatient Medications   Medication Sig Dispense Refill    acetaminophen (TYLENOL) 500 MG tablet Take 500 mg by mouth 2 times daily      fluticasone (FLONASE) 50 MCG/ACT nasal spray Spray 2 sprays into both nostrils daily as needed for rhinitis or allergies      gabapentin (NEURONTIN) 100 MG capsule Take 100 mg by mouth daily 100 mg at bedtime scheduled  mg once daily PRN for neuropathy      losartan (COZAAR) 25 MG tablet Take 25 mg by mouth daily      morphine (MS CONTIN) 15 MG CR tablet Take 1 tablet (15 mg) by mouth At Bedtime 15 tablet 0    omeprazole (PRILOSEC OTC) 20 MG EC tablet Take 20 mg by mouth daily      oxyCODONE-acetaminophen (PERCOCET) 5-325 MG tablet Take 0.5 tablets by mouth every 4 hours as needed for severe pain      polyethylene glycol (MIRALAX) 17 g packet Take 17 g by mouth daily Hold for loose stools      senna-docusate  "(SENOKOT-S/PERICOLACE) 8.6-50 MG tablet Take 1 tablet by mouth 2 times daily      WARFARIN SODIUM PO Take by mouth See Admin Instructions Dosing in coordination with INR results       Controlled medications:   Medication: MS contin , ~15 tabs given to patient at the time of discharge to take home  Medication: percocet  , 30 half tabs tabs given to patient at the time of discharge to take home     Past Medical History: No past medical history on file.  Physical Exam:   Vitals: /53   Pulse 105   Temp 98.3  F (36.8  C)   Resp 22   Ht 1.549 m (5' 1\")   Wt 49 kg (108 lb 1.9 oz)   SpO2 99%   BMI 20.43 kg/m    BMI: Body mass index is 20.43 kg/m .  GENERAL APPEARANCE:  Alert, in no distress, cooperative,   RESP:  lungs clear to auscultation , no respiratory distress   CV:  regular rate and rhythm, no murmur, rub, or gallop, no edema  ABDOMEN:  bowel sounds normal,   M/S:   decreased ROM to right hip   SKIN:  wound appearance: right hip incision CDI  NEURO:   Cn 2-12 grossly intact,   PSYCH:  mood normal, forgetgul      SNF Labs: Recent labs in [facility] reviewed by me today.  and Most Recent 3 CBC's:  Recent Labs   Lab Test 07/26/23  0700   HGB 8.4*     Most Recent 3 BMP's:No lab results found.  Most Recent 3 INR's:  Recent Labs   Lab Test 08/07/23  0700 08/04/23  0623 08/02/23  0740   INR 2.34* 2.37* 2.16*       DISCHARGE PLAN:  Follow up labs: INR on 8/11, then TBD  Medical Follow Up:   Follow up with primary care provider in 2 weeks  Follow up with specialist 6 weeks   [facility] scheduled appointments: None.  Discharge Services: Out Patient: Physical Therapy and Occupational Therapy.  Discharge Instructions Verbalized to Patient at Discharge:   Weight bearing restrictions:  Weight bearing as tolerated.   Wound care leave BULMARO.   OK to shower but no bathing or soaking until approved by surgeon.     TOTAL DISCHARGE TIME: Greater than 30 minutes  Electronically signed by:  FRIDA An " CNP        DME (Durable Medical Equipment) Orders and Documentation  Orders Placed This Encounter   Procedures    Walker Order        The patient was assessed and it was determined the patient is in need of the following listed DME Supplies/Equipment. Please complete supporting documentation below to demonstrate medical necessity.      Due to history of falls, dementia, and unsteady gait due to recent right hip fracture would benefit from lifetime use of 2WW to improve gait stability, reduce falls risk and improve quality of life.

## 2023-08-07 NOTE — TELEPHONE ENCOUNTER
[facility] Geriatrics Triage Nurse INR     Provider: FRIDA Saab CNP  Facility: [facility]  Facility Type:  TCU    Caller: Jeanette    Reason for call: INR  Diagnosis/Goal: A. Fib    Todays INR: 2.34  Last INR 2.37 on 8/4 - 2 mg on 8/4 and 8/6; 1 mg 8/5    Heparin/Lovenox:  No  Currently on ABX?: No  Other interacting medication:  None  Missed or refused doses: No    Verbal Order/Direction given by Provider: Coumadin 1 mg PO on 8/8, 2mg on 8/7, 8/9 and 8/10. Check INR on 8/11.     Provider Giving Order:  FRIDA Saab CNP    Verbal Order given to: Jeanette Alonso RN

## 2023-08-08 ENCOUNTER — DISCHARGE SUMMARY NURSING HOME (OUTPATIENT)
Dept: GERIATRICS | Facility: CLINIC | Age: 88
End: 2023-08-08
Payer: MEDICARE

## 2023-08-08 VITALS
DIASTOLIC BLOOD PRESSURE: 53 MMHG | HEART RATE: 105 BPM | RESPIRATION RATE: 22 BRPM | BODY MASS INDEX: 20.41 KG/M2 | TEMPERATURE: 98.3 F | WEIGHT: 108.12 LBS | SYSTOLIC BLOOD PRESSURE: 120 MMHG | HEIGHT: 61 IN | OXYGEN SATURATION: 99 %

## 2023-08-08 DIAGNOSIS — G60.9 HEREDITARY AND IDIOPATHIC PERIPHERAL NEUROPATHY: ICD-10-CM

## 2023-08-08 DIAGNOSIS — I11.0 HYPERTENSIVE HEART FAILURE (H): ICD-10-CM

## 2023-08-08 DIAGNOSIS — I48.20 CHRONIC ATRIAL FIBRILLATION (H): ICD-10-CM

## 2023-08-08 DIAGNOSIS — D64.9 POSTOPERATIVE ANEMIA: ICD-10-CM

## 2023-08-08 DIAGNOSIS — F33.9 DEPRESSION, RECURRENT (H): ICD-10-CM

## 2023-08-08 DIAGNOSIS — F11.29 OPIOID DEPENDENCE WITH OPIOID-INDUCED DISORDER (H): ICD-10-CM

## 2023-08-08 DIAGNOSIS — R53.81 PHYSICAL DECONDITIONING: ICD-10-CM

## 2023-08-08 DIAGNOSIS — S72.001D CLOSED RIGHT HIP FRACTURE, WITH ROUTINE HEALING, SUBSEQUENT ENCOUNTER: Primary | ICD-10-CM

## 2023-08-08 DIAGNOSIS — G89.4 CHRONIC PAIN SYNDROME: ICD-10-CM

## 2023-08-08 PROCEDURE — 99316 NF DSCHRG MGMT 30 MIN+: CPT | Performed by: NURSE PRACTITIONER

## 2023-08-08 NOTE — LETTER
8/8/2023        RE: Irena Hand  [address]  [place] [address]        [facility] GERIATRICS DISCHARGE SUMMARY  Patient Name: Irena Hand  YOB: 1932  [place] Medical Record Number: [id number]  Place of Service Where Encounter Took Place: [facility] ([facility]) [[id number]]    PRIMARY CARE PROVIDER AND CLINIC RESPONSIBLE AFTER TRANSFER: Erica Olson MD, [address] [facility] [address] / [facility] [address]; Non-FMG Provider     Transferring providers: FRIDA Saab CNP; Anshul Lucas MD  Recent Hospitalization/ED: Hospital  [facility] stay 7/18/23 to 7/24/23.  Date of SNF Admission: July 24, 2023  Date of SNF (anticipated) Discharge: August 10, 2023  Discharged to: previous assisted living  Cognitive Scores: SLUMS: 12/30, CPT: 3.8/5.6, and ACL: 4.2/5.8  Physical Function: Ambulating 300 ft with SBA  DME: Walker and DME F2F done 8/8    CODE STATUS/ADVANCE DIRECTIVES DISCUSSION: Full Code   ALLERGIES: Cats, Perfume, and Smoke.    NURSING FACILITY COURSE:  Medication Changes/Rationale:   Stopped zofran an no longer needed    Summary of nursing facility stay:   From NP note on 7/26: Irena Hand is a 89 y.o. female with a history of Paroxysmal atrial fibrillation (HC) on coumadin, Chronic diastolic CHF (congestive heart failure), Anemia of unknown etiology, Idiopathic peripheral neuropathy, Monoclonal gammopathy, Primary hypertension, who presented to [facility] emergency department on 7/18/2023 for right hip pain after a mechanical fall at home. [facility] ED evaluation:hip x-rays showed a right femoral neck hip fracture, chest and right elbow x-ray were negative.   Underwent Rt DAA Hip hemiarthroplasty by Dr Vallejo om 7/20/23 without immediate complication.  Hgb dropped to 7.8 on 7/23, noted hematoma on right buttock.  Hgb up to 8.6 and INR 1.4 @ discharge.   Also experienced some confusion, thought to be 2/2 opiates.    Closed right hip fracture, with  routine healing, subsequent encounter  Chronic pain syndrome  Opioid dependence with opioid-induced disorder (H)  Physical deconditioning  Secondary to mechanical fall. Pain controlled. Made progress with PT and OT. Had follow-up with ortho today, healing well. She is discharging to [facility].   - Walker Order  - analgesia with oxycodone/APAP 1/2 tab q4h PRN, APAP 500mg BID, MS Contin 15mg at bedtime,   - follow-up with ortho in 6 weeks  - follow-up with outpatient PT/OT    Chronic atrial fibrillation (H)  HR controlled. Anticoagulated on coumadin, most recent INR 2.34  - coumadin as ordered  - will check INR on 8/11 prior to discharge    Hypertensive heart failure (H)  BP controlled 110-140  - continue losartan    Depression, recurrent (H)  Mood stable  - monitor     Postoperative anemia  Hgb 7.8 post-op, did not receive transfusion. Hgb stable at 8.4 in TCU    Hereditary and idiopathic peripheral neuropathy  Controlled  - gabapentin 100mg at bedtime and 100mg daily PRN      Discharge Medications:  MED REC REQUIRED  Post Medication Reconciliation Status:  Medication reconciliation previously completed during another office visit    Current Outpatient Medications   Medication Sig Dispense Refill     acetaminophen (TYLENOL) 500 MG tablet Take 500 mg by mouth 2 times daily       fluticasone (FLONASE) 50 MCG/ACT nasal spray Spray 2 sprays into both nostrils daily as needed for rhinitis or allergies       gabapentin (NEURONTIN) 100 MG capsule Take 100 mg by mouth daily 100 mg at bedtime scheduled  mg once daily PRN for neuropathy       losartan (COZAAR) 25 MG tablet Take 25 mg by mouth daily       morphine (MS CONTIN) 15 MG CR tablet Take 1 tablet (15 mg) by mouth At Bedtime 15 tablet 0     omeprazole (PRILOSEC OTC) 20 MG EC tablet Take 20 mg by mouth daily       oxyCODONE-acetaminophen (PERCOCET) 5-325 MG tablet Take 0.5 tablets by mouth every 4 hours as needed for severe pain       polyethylene glycol (MIRALAX) 17 g  "packet Take 17 g by mouth daily Hold for loose stools       senna-docusate (SENOKOT-S/PERICOLACE) 8.6-50 MG tablet Take 1 tablet by mouth 2 times daily       WARFARIN SODIUM PO Take by mouth See Admin Instructions Dosing in coordination with INR results       Controlled medications:   Medication: MS contin , ~15 tabs given to patient at the time of discharge to take home  Medication: percocet  , 30 half tabs tabs given to patient at the time of discharge to take home     Past Medical History: No past medical history on file.  Physical Exam:   Vitals: /53   Pulse 105   Temp 98.3  F (36.8  C)   Resp 22   Ht 1.549 m (5' 1\")   Wt 49 kg (108 lb 1.9 oz)   SpO2 99%   BMI 20.43 kg/m    BMI: Body mass index is 20.43 kg/m .  GENERAL APPEARANCE:  Alert, in no distress, cooperative,   RESP:  lungs clear to auscultation , no respiratory distress   CV:  regular rate and rhythm, no murmur, rub, or gallop, no edema  ABDOMEN:  bowel sounds normal,   M/S:   decreased ROM to right hip   SKIN:  wound appearance: right hip incision CDI  NEURO:   Cn 2-12 grossly intact,   PSYCH:  mood normal, forgetgul      SNF Labs: Recent labs in [facility] reviewed by me today.  and Most Recent 3 CBC's:  Recent Labs   Lab Test 07/26/23  0700   HGB 8.4*     Most Recent 3 BMP's:No lab results found.  Most Recent 3 INR's:  Recent Labs   Lab Test 08/07/23  0700 08/04/23  0623 08/02/23  0740   INR 2.34* 2.37* 2.16*       DISCHARGE PLAN:  Follow up labs: INR on 8/11, then TBD  Medical Follow Up:   Follow up with primary care provider in 2 weeks  Follow up with specialist 6 weeks   [facility] scheduled appointments: None.  Discharge Services: Out Patient: Physical Therapy and Occupational Therapy.  Discharge Instructions Verbalized to Patient at Discharge:   Weight bearing restrictions:  Weight bearing as tolerated.   Wound care leave BULMARO.   OK to shower but no bathing or soaking until approved by surgeon.     TOTAL DISCHARGE TIME: Greater than " 30 minutes  Electronically signed by:  FRIDA An CNP        DME (Durable Medical Equipment) Orders and Documentation  Orders Placed This Encounter   Procedures     Walker Order        The patient was assessed and it was determined the patient is in need of the following listed DME Supplies/Equipment. Please complete supporting documentation below to demonstrate medical necessity.      Due to history of falls, dementia, and unsteady gait due to recent right hip fracture would benefit from lifetime use of 2WW to improve gait stability, reduce falls risk and improve quality of life.             Sincerely,        FRIDA An CNP

## 2023-08-10 ENCOUNTER — LAB REQUISITION (OUTPATIENT)
Dept: LAB | Facility: CLINIC | Age: 88
End: 2023-08-10
Payer: MEDICARE

## 2023-08-10 DIAGNOSIS — I48.91 UNSPECIFIED ATRIAL FIBRILLATION (H): ICD-10-CM

## 2023-08-10 NOTE — PROGRESS NOTES
"[facility] GERIATRICS  [place] Medical Record Number: [id number]  Place of Service where encounter took place: [facility] ([facility]) [[id number]]    HPI:    Irena Hand is a 89 year old (11/18/1932), who is being seen today for an episodic care visit at the above location. Today's concern is INR/Coumadin management for A. Fib    ROS/Subjective:  Bleeding Signs/Symptoms: None  Thromboembolic Signs/Symptoms: None  Medication Changes: No  Dietary Changes: No  Activity Changes: No  Bacterial/Viral Infection: No  Missed Coumadin Doses: None  On ASA: No  Other Concerns: No    OBJECTIVE:  /59   Pulse 80   Temp 97.3  F (36.3  C)   Resp 16   Ht 1.549 m (5' 1\")   Wt 48.6 kg (107 lb 3.2 oz)   SpO2 97%   BMI 20.26 kg/m    Last INR: 2.34 on 8/7  INR Today: 2/46  Current Dose:  1 mg PO on 8/8, 2mg on 8/7, 8/9 and 8/10       ASSESSMENT:  1. Chronic atrial fibrillation (H)    2. Long term (current) use of anticoagulants    3. Encounter for therapeutic drug monitoring      Therapeutic INR for goal of 2-3    PLAN/ORDERS:     New Dose: Coumadin 1mg on Sat and Tues, 2mg QOD    Next INR: 1 week      Electronically signed by: FRIDA An CNP  "

## 2023-08-11 ENCOUNTER — TRANSITIONAL CARE UNIT VISIT (OUTPATIENT)
Dept: GERIATRICS | Facility: CLINIC | Age: 88
End: 2023-08-11
Payer: MEDICARE

## 2023-08-11 VITALS
HEART RATE: 80 BPM | TEMPERATURE: 97.3 F | RESPIRATION RATE: 16 BRPM | BODY MASS INDEX: 20.24 KG/M2 | DIASTOLIC BLOOD PRESSURE: 59 MMHG | OXYGEN SATURATION: 97 % | SYSTOLIC BLOOD PRESSURE: 129 MMHG | HEIGHT: 61 IN | WEIGHT: 107.2 LBS

## 2023-08-11 DIAGNOSIS — Z79.01 LONG TERM (CURRENT) USE OF ANTICOAGULANTS: ICD-10-CM

## 2023-08-11 DIAGNOSIS — I48.20 CHRONIC ATRIAL FIBRILLATION (H): Primary | ICD-10-CM

## 2023-08-11 DIAGNOSIS — Z51.81 ENCOUNTER FOR THERAPEUTIC DRUG MONITORING: ICD-10-CM

## 2023-08-11 LAB — INR PPP: 2.46 (ref 0.85–1.15)

## 2023-08-11 PROCEDURE — 99308 SBSQ NF CARE LOW MDM 20: CPT | Performed by: NURSE PRACTITIONER

## 2023-08-11 PROCEDURE — P9603 ONE-WAY ALLOW PRORATED MILES: HCPCS | Mod: ORL | Performed by: FAMILY MEDICINE

## 2023-08-11 PROCEDURE — 36415 COLL VENOUS BLD VENIPUNCTURE: CPT | Mod: ORL | Performed by: FAMILY MEDICINE

## 2023-08-11 PROCEDURE — 85610 PROTHROMBIN TIME: CPT | Mod: ORL | Performed by: FAMILY MEDICINE

## 2023-08-11 NOTE — LETTER
"    8/11/2023        RE: Irena Hand  [address]  [facility] [address]        [facility]  [place] Medical Record Number: [id number]  Place of Service where encounter took place: [facility] ([facility]) [[id number]]    HPI:    Irena Hand is a 89 year old (11/18/1932), who is being seen today for an episodic care visit at the above location. Today's concern is INR/Coumadin management for A. Fib    ROS/Subjective:  Bleeding Signs/Symptoms: None  Thromboembolic Signs/Symptoms: None  Medication Changes: No  Dietary Changes: No  Activity Changes: No  Bacterial/Viral Infection: No  Missed Coumadin Doses: None  On ASA: No  Other Concerns: No    OBJECTIVE:  /59   Pulse 80   Temp 97.3  F (36.3  C)   Resp 16   Ht 1.549 m (5' 1\")   Wt 48.6 kg (107 lb 3.2 oz)   SpO2 97%   BMI 20.26 kg/m    Last INR: 2.34 on 8/7  INR Today: 2/46  Current Dose:  1 mg PO on 8/8, 2mg on 8/7, 8/9 and 8/10       ASSESSMENT:  1. Chronic atrial fibrillation (H)    2. Long term (current) use of anticoagulants    3. Encounter for therapeutic drug monitoring      Therapeutic INR for goal of 2-3    PLAN/ORDERS:     New Dose: Coumadin 1mg on Sat and Tues, 2mg QOD    Next INR: 1 week      Electronically signed by: FRIDA An CNP      Sincerely,        FRIDA An CNP      "

## 2023-08-17 ENCOUNTER — LAB REQUISITION (OUTPATIENT)
Dept: LAB | Facility: CLINIC | Age: 88
End: 2023-08-17
Payer: MEDICARE

## 2023-08-17 DIAGNOSIS — I48.20 CHRONIC ATRIAL FIBRILLATION, UNSPECIFIED (H): ICD-10-CM

## 2023-08-18 LAB — INR PPP: 3.1 (ref 0.85–1.15)

## 2023-08-18 PROCEDURE — 36415 COLL VENOUS BLD VENIPUNCTURE: CPT | Mod: ORL

## 2023-08-18 PROCEDURE — P9603 ONE-WAY ALLOW PRORATED MILES: HCPCS | Mod: ORL

## 2023-08-18 PROCEDURE — 85610 PROTHROMBIN TIME: CPT | Mod: ORL

## 2023-08-24 ENCOUNTER — LAB REQUISITION (OUTPATIENT)
Dept: LAB | Facility: CLINIC | Age: 88
End: 2023-08-24
Payer: MEDICARE

## 2023-08-24 DIAGNOSIS — Z79.01 LONG TERM (CURRENT) USE OF ANTICOAGULANTS: ICD-10-CM

## 2023-08-25 LAB — INR PPP: 2.26 (ref 0.85–1.15)

## 2023-08-25 PROCEDURE — P9603 ONE-WAY ALLOW PRORATED MILES: HCPCS | Mod: ORL

## 2023-08-25 PROCEDURE — 85610 PROTHROMBIN TIME: CPT | Mod: ORL

## 2023-08-25 PROCEDURE — 36415 COLL VENOUS BLD VENIPUNCTURE: CPT | Mod: ORL

## 2023-09-21 ENCOUNTER — LAB REQUISITION (OUTPATIENT)
Dept: LAB | Facility: CLINIC | Age: 88
End: 2023-09-21
Payer: MEDICARE

## 2023-09-21 DIAGNOSIS — I48.20 CHRONIC ATRIAL FIBRILLATION, UNSPECIFIED (H): ICD-10-CM

## 2023-09-22 LAB — INR PPP: 1.49 (ref 0.85–1.15)

## 2023-09-22 PROCEDURE — P9603 ONE-WAY ALLOW PRORATED MILES: HCPCS | Mod: ORL

## 2023-09-22 PROCEDURE — 36415 COLL VENOUS BLD VENIPUNCTURE: CPT | Mod: ORL

## 2023-09-22 PROCEDURE — 85610 PROTHROMBIN TIME: CPT | Mod: ORL

## 2023-09-28 ENCOUNTER — LAB REQUISITION (OUTPATIENT)
Dept: LAB | Facility: CLINIC | Age: 88
End: 2023-09-28
Payer: MEDICARE

## 2023-09-28 DIAGNOSIS — I48.20 CHRONIC ATRIAL FIBRILLATION, UNSPECIFIED (H): ICD-10-CM

## 2023-09-29 LAB — INR PPP: 1.83 (ref 0.85–1.15)

## 2023-09-29 PROCEDURE — P9603 ONE-WAY ALLOW PRORATED MILES: HCPCS | Mod: ORL | Performed by: STUDENT IN AN ORGANIZED HEALTH CARE EDUCATION/TRAINING PROGRAM

## 2023-09-29 PROCEDURE — 36415 COLL VENOUS BLD VENIPUNCTURE: CPT | Mod: ORL | Performed by: STUDENT IN AN ORGANIZED HEALTH CARE EDUCATION/TRAINING PROGRAM

## 2023-09-29 PROCEDURE — 85610 PROTHROMBIN TIME: CPT | Mod: ORL | Performed by: STUDENT IN AN ORGANIZED HEALTH CARE EDUCATION/TRAINING PROGRAM

## 2023-10-10 ENCOUNTER — LAB REQUISITION (OUTPATIENT)
Dept: LAB | Facility: CLINIC | Age: 88
End: 2023-10-10
Payer: MEDICARE

## 2023-10-10 DIAGNOSIS — I48.20 CHRONIC ATRIAL FIBRILLATION, UNSPECIFIED (H): ICD-10-CM

## 2023-10-11 LAB — INR PPP: 2.96 (ref 0.85–1.15)

## 2023-10-11 PROCEDURE — 85610 PROTHROMBIN TIME: CPT | Mod: ORL | Performed by: STUDENT IN AN ORGANIZED HEALTH CARE EDUCATION/TRAINING PROGRAM

## 2023-10-24 ENCOUNTER — LAB REQUISITION (OUTPATIENT)
Dept: LAB | Facility: CLINIC | Age: 88
End: 2023-10-24
Payer: MEDICARE

## 2023-10-24 DIAGNOSIS — I48.20 CHRONIC ATRIAL FIBRILLATION, UNSPECIFIED (H): ICD-10-CM

## 2023-10-25 LAB — INR PPP: 2.84 (ref 0.85–1.15)

## 2023-10-25 PROCEDURE — 85610 PROTHROMBIN TIME: CPT | Mod: ORL | Performed by: STUDENT IN AN ORGANIZED HEALTH CARE EDUCATION/TRAINING PROGRAM

## 2023-10-25 PROCEDURE — P9603 ONE-WAY ALLOW PRORATED MILES: HCPCS | Mod: ORL | Performed by: STUDENT IN AN ORGANIZED HEALTH CARE EDUCATION/TRAINING PROGRAM

## 2023-10-25 PROCEDURE — 36415 COLL VENOUS BLD VENIPUNCTURE: CPT | Mod: ORL | Performed by: STUDENT IN AN ORGANIZED HEALTH CARE EDUCATION/TRAINING PROGRAM

## 2023-11-14 ENCOUNTER — LAB REQUISITION (OUTPATIENT)
Dept: LAB | Facility: CLINIC | Age: 88
End: 2023-11-14
Payer: MEDICARE

## 2023-11-14 DIAGNOSIS — I48.20 CHRONIC ATRIAL FIBRILLATION, UNSPECIFIED (H): ICD-10-CM

## 2023-11-15 LAB — INR PPP: 3.82 (ref 0.85–1.15)

## 2023-11-15 PROCEDURE — 36415 COLL VENOUS BLD VENIPUNCTURE: CPT | Mod: ORL | Performed by: STUDENT IN AN ORGANIZED HEALTH CARE EDUCATION/TRAINING PROGRAM

## 2023-11-15 PROCEDURE — 85610 PROTHROMBIN TIME: CPT | Mod: ORL | Performed by: STUDENT IN AN ORGANIZED HEALTH CARE EDUCATION/TRAINING PROGRAM

## 2023-11-15 PROCEDURE — P9603 ONE-WAY ALLOW PRORATED MILES: HCPCS | Mod: ORL | Performed by: STUDENT IN AN ORGANIZED HEALTH CARE EDUCATION/TRAINING PROGRAM

## 2023-11-28 ENCOUNTER — LAB REQUISITION (OUTPATIENT)
Dept: LAB | Facility: CLINIC | Age: 88
End: 2023-11-28
Payer: MEDICARE

## 2023-11-28 DIAGNOSIS — I48.20 CHRONIC ATRIAL FIBRILLATION, UNSPECIFIED (H): ICD-10-CM

## 2023-11-30 ENCOUNTER — LAB REQUISITION (OUTPATIENT)
Dept: LAB | Facility: CLINIC | Age: 88
End: 2023-11-30
Payer: MEDICARE

## 2023-11-30 DIAGNOSIS — I48.20 CHRONIC ATRIAL FIBRILLATION, UNSPECIFIED (H): ICD-10-CM

## 2023-12-01 LAB — INR PPP: 4.53 (ref 0.85–1.15)

## 2023-12-01 PROCEDURE — 85610 PROTHROMBIN TIME: CPT | Mod: ORL

## 2023-12-01 PROCEDURE — 36415 COLL VENOUS BLD VENIPUNCTURE: CPT | Mod: ORL

## 2023-12-01 PROCEDURE — P9603 ONE-WAY ALLOW PRORATED MILES: HCPCS | Mod: ORL

## 2023-12-03 ENCOUNTER — LAB REQUISITION (OUTPATIENT)
Dept: LAB | Facility: CLINIC | Age: 88
End: 2023-12-03
Payer: MEDICARE

## 2023-12-03 DIAGNOSIS — I48.20 CHRONIC ATRIAL FIBRILLATION, UNSPECIFIED (H): ICD-10-CM

## 2023-12-04 LAB — INR PPP: 1.95 (ref 0.85–1.15)

## 2023-12-04 PROCEDURE — 36415 COLL VENOUS BLD VENIPUNCTURE: CPT | Mod: ORL

## 2023-12-04 PROCEDURE — 85610 PROTHROMBIN TIME: CPT | Mod: ORL

## 2023-12-04 PROCEDURE — P9603 ONE-WAY ALLOW PRORATED MILES: HCPCS | Mod: ORL

## 2023-12-10 ENCOUNTER — LAB REQUISITION (OUTPATIENT)
Dept: LAB | Facility: CLINIC | Age: 88
End: 2023-12-10
Payer: MEDICARE

## 2023-12-10 DIAGNOSIS — I48.20 CHRONIC ATRIAL FIBRILLATION, UNSPECIFIED (H): ICD-10-CM

## 2023-12-11 LAB — INR PPP: 1.75 (ref 0.85–1.15)

## 2023-12-11 PROCEDURE — 85610 PROTHROMBIN TIME: CPT | Mod: ORL

## 2023-12-11 PROCEDURE — 36415 COLL VENOUS BLD VENIPUNCTURE: CPT | Mod: ORL

## 2023-12-11 PROCEDURE — P9603 ONE-WAY ALLOW PRORATED MILES: HCPCS | Mod: ORL

## 2023-12-12 ENCOUNTER — LAB REQUISITION (OUTPATIENT)
Dept: LAB | Facility: CLINIC | Age: 88
End: 2023-12-12
Payer: MEDICARE

## 2023-12-12 DIAGNOSIS — F33.41 MAJOR DEPRESSIVE DISORDER, RECURRENT, IN PARTIAL REMISSION (H): ICD-10-CM

## 2023-12-12 DIAGNOSIS — I10 ESSENTIAL (PRIMARY) HYPERTENSION: ICD-10-CM

## 2023-12-13 LAB
ALBUMIN SERPL BCG-MCNC: 3.6 G/DL (ref 3.5–5.2)
ALP SERPL-CCNC: 64 U/L (ref 40–150)
ALT SERPL W P-5'-P-CCNC: 9 U/L (ref 0–50)
ANION GAP SERPL CALCULATED.3IONS-SCNC: 10 MMOL/L (ref 7–15)
AST SERPL W P-5'-P-CCNC: 16 U/L (ref 0–45)
BILIRUB SERPL-MCNC: 0.3 MG/DL
BUN SERPL-MCNC: 17.8 MG/DL (ref 8–23)
CALCIUM SERPL-MCNC: 8.9 MG/DL (ref 8.2–9.6)
CHLORIDE SERPL-SCNC: 106 MMOL/L (ref 98–107)
CREAT SERPL-MCNC: 0.86 MG/DL (ref 0.51–0.95)
DEPRECATED HCO3 PLAS-SCNC: 24 MMOL/L (ref 22–29)
EGFRCR SERPLBLD CKD-EPI 2021: 63 ML/MIN/1.73M2
ERYTHROCYTE [DISTWIDTH] IN BLOOD BY AUTOMATED COUNT: 20.6 % (ref 10–15)
GLUCOSE SERPL-MCNC: 100 MG/DL (ref 70–99)
HCT VFR BLD AUTO: 27.3 % (ref 35–47)
HGB BLD-MCNC: 7.8 G/DL (ref 11.7–15.7)
MCH RBC QN AUTO: 21.4 PG (ref 26.5–33)
MCHC RBC AUTO-ENTMCNC: 28.6 G/DL (ref 31.5–36.5)
MCV RBC AUTO: 75 FL (ref 78–100)
PLATELET # BLD AUTO: 276 10E3/UL (ref 150–450)
POTASSIUM SERPL-SCNC: 4.6 MMOL/L (ref 3.4–5.3)
PROT SERPL-MCNC: 5.9 G/DL (ref 6.4–8.3)
RBC # BLD AUTO: 3.64 10E6/UL (ref 3.8–5.2)
SODIUM SERPL-SCNC: 140 MMOL/L (ref 135–145)
TSH SERPL DL<=0.005 MIU/L-ACNC: 1.82 UIU/ML (ref 0.3–4.2)
WBC # BLD AUTO: 6.4 10E3/UL (ref 4–11)

## 2023-12-13 PROCEDURE — 85027 COMPLETE CBC AUTOMATED: CPT | Mod: ORL

## 2023-12-13 PROCEDURE — 36415 COLL VENOUS BLD VENIPUNCTURE: CPT | Mod: ORL

## 2023-12-13 PROCEDURE — P9603 ONE-WAY ALLOW PRORATED MILES: HCPCS | Mod: ORL

## 2023-12-13 PROCEDURE — 80053 COMPREHEN METABOLIC PANEL: CPT | Mod: ORL

## 2023-12-13 PROCEDURE — 84443 ASSAY THYROID STIM HORMONE: CPT | Mod: ORL

## 2023-12-17 ENCOUNTER — LAB REQUISITION (OUTPATIENT)
Dept: LAB | Facility: CLINIC | Age: 88
End: 2023-12-17
Payer: MEDICARE

## 2023-12-17 DIAGNOSIS — I48.20 CHRONIC ATRIAL FIBRILLATION, UNSPECIFIED (H): ICD-10-CM

## 2023-12-18 LAB — INR PPP: 1.93 (ref 0.85–1.15)

## 2023-12-18 PROCEDURE — 85610 PROTHROMBIN TIME: CPT | Mod: ORL

## 2023-12-18 PROCEDURE — 36415 COLL VENOUS BLD VENIPUNCTURE: CPT | Mod: ORL

## 2023-12-18 PROCEDURE — P9603 ONE-WAY ALLOW PRORATED MILES: HCPCS | Mod: ORL

## 2023-12-26 ENCOUNTER — LAB REQUISITION (OUTPATIENT)
Dept: LAB | Facility: CLINIC | Age: 88
End: 2023-12-26
Payer: MEDICARE

## 2023-12-26 DIAGNOSIS — I48.20 CHRONIC ATRIAL FIBRILLATION, UNSPECIFIED (H): ICD-10-CM

## 2023-12-27 LAB — INR PPP: 1.97 (ref 0.85–1.15)

## 2023-12-27 PROCEDURE — P9603 ONE-WAY ALLOW PRORATED MILES: HCPCS | Mod: ORL

## 2023-12-27 PROCEDURE — 36415 COLL VENOUS BLD VENIPUNCTURE: CPT | Mod: ORL

## 2023-12-27 PROCEDURE — 85610 PROTHROMBIN TIME: CPT | Mod: ORL

## 2024-01-02 ENCOUNTER — LAB REQUISITION (OUTPATIENT)
Dept: LAB | Facility: CLINIC | Age: 89
End: 2024-01-02
Payer: MEDICARE

## 2024-01-02 DIAGNOSIS — I48.20 CHRONIC ATRIAL FIBRILLATION, UNSPECIFIED (H): ICD-10-CM

## 2024-01-03 LAB — INR PPP: 2.23 (ref 0.85–1.15)

## 2024-01-03 PROCEDURE — 85610 PROTHROMBIN TIME: CPT | Mod: ORL

## 2024-01-09 ENCOUNTER — LAB REQUISITION (OUTPATIENT)
Dept: LAB | Facility: CLINIC | Age: 89
End: 2024-01-09
Payer: MEDICARE

## 2024-01-09 DIAGNOSIS — I48.20 CHRONIC ATRIAL FIBRILLATION, UNSPECIFIED (H): ICD-10-CM

## 2024-01-10 LAB — INR PPP: 2.03 (ref 0.85–1.15)

## 2024-01-10 PROCEDURE — 36415 COLL VENOUS BLD VENIPUNCTURE: CPT | Mod: ORL

## 2024-01-10 PROCEDURE — 85610 PROTHROMBIN TIME: CPT | Mod: ORL

## 2024-01-10 PROCEDURE — P9603 ONE-WAY ALLOW PRORATED MILES: HCPCS | Mod: ORL

## 2024-01-23 ENCOUNTER — LAB REQUISITION (OUTPATIENT)
Dept: LAB | Facility: CLINIC | Age: 89
End: 2024-01-23
Payer: MEDICARE

## 2024-01-23 DIAGNOSIS — I48.20 CHRONIC ATRIAL FIBRILLATION, UNSPECIFIED (H): ICD-10-CM

## 2024-01-24 LAB — INR PPP: 2.67 (ref 0.85–1.15)

## 2024-01-24 PROCEDURE — 85610 PROTHROMBIN TIME: CPT | Mod: ORL

## 2024-01-24 PROCEDURE — 36415 COLL VENOUS BLD VENIPUNCTURE: CPT | Mod: ORL

## 2024-01-24 PROCEDURE — P9603 ONE-WAY ALLOW PRORATED MILES: HCPCS | Mod: ORL

## 2024-01-30 ENCOUNTER — LAB REQUISITION (OUTPATIENT)
Dept: LAB | Facility: CLINIC | Age: 89
End: 2024-01-30
Payer: MEDICARE

## 2024-01-30 DIAGNOSIS — I48.20 CHRONIC ATRIAL FIBRILLATION, UNSPECIFIED (H): ICD-10-CM

## 2024-01-31 LAB — INR PPP: 1.57 (ref 0.85–1.15)

## 2024-01-31 PROCEDURE — 85610 PROTHROMBIN TIME: CPT | Mod: ORL

## 2024-01-31 PROCEDURE — 36415 COLL VENOUS BLD VENIPUNCTURE: CPT | Mod: ORL

## 2024-01-31 PROCEDURE — P9603 ONE-WAY ALLOW PRORATED MILES: HCPCS | Mod: ORL

## 2024-02-06 ENCOUNTER — LAB REQUISITION (OUTPATIENT)
Dept: LAB | Facility: CLINIC | Age: 89
End: 2024-02-06
Payer: MEDICARE

## 2024-02-06 DIAGNOSIS — I48.20 CHRONIC ATRIAL FIBRILLATION, UNSPECIFIED (H): ICD-10-CM

## 2024-02-07 LAB — INR PPP: 2.27 (ref 0.85–1.15)

## 2024-02-07 PROCEDURE — 85610 PROTHROMBIN TIME: CPT | Mod: ORL

## 2024-02-07 PROCEDURE — P9603 ONE-WAY ALLOW PRORATED MILES: HCPCS | Mod: ORL

## 2024-02-07 PROCEDURE — 36415 COLL VENOUS BLD VENIPUNCTURE: CPT | Mod: ORL

## 2024-02-13 ENCOUNTER — LAB REQUISITION (OUTPATIENT)
Dept: LAB | Facility: CLINIC | Age: 89
End: 2024-02-13
Payer: MEDICARE

## 2024-02-13 DIAGNOSIS — I48.20 CHRONIC ATRIAL FIBRILLATION, UNSPECIFIED (H): ICD-10-CM

## 2024-02-14 LAB — INR PPP: 2.87 (ref 0.85–1.15)

## 2024-02-14 PROCEDURE — P9603 ONE-WAY ALLOW PRORATED MILES: HCPCS | Mod: ORL

## 2024-02-14 PROCEDURE — 36415 COLL VENOUS BLD VENIPUNCTURE: CPT | Mod: ORL

## 2024-02-14 PROCEDURE — 85610 PROTHROMBIN TIME: CPT | Mod: ORL

## 2024-02-20 ENCOUNTER — LAB REQUISITION (OUTPATIENT)
Dept: LAB | Facility: CLINIC | Age: 89
End: 2024-02-20
Payer: MEDICARE

## 2024-02-20 DIAGNOSIS — I48.20 CHRONIC ATRIAL FIBRILLATION, UNSPECIFIED (H): ICD-10-CM

## 2024-02-21 LAB — INR PPP: 3.36 (ref 0.85–1.15)

## 2024-02-21 PROCEDURE — P9603 ONE-WAY ALLOW PRORATED MILES: HCPCS | Mod: ORL

## 2024-02-21 PROCEDURE — 85610 PROTHROMBIN TIME: CPT | Mod: ORL

## 2024-02-21 PROCEDURE — 36415 COLL VENOUS BLD VENIPUNCTURE: CPT | Mod: ORL

## 2024-02-27 ENCOUNTER — LAB REQUISITION (OUTPATIENT)
Dept: LAB | Facility: CLINIC | Age: 89
End: 2024-02-27
Payer: MEDICARE

## 2024-02-27 DIAGNOSIS — I48.20 CHRONIC ATRIAL FIBRILLATION, UNSPECIFIED (H): ICD-10-CM

## 2024-02-28 LAB — INR PPP: 3.01 (ref 0.85–1.15)

## 2024-02-28 PROCEDURE — 36415 COLL VENOUS BLD VENIPUNCTURE: CPT | Mod: ORL

## 2024-02-28 PROCEDURE — 85610 PROTHROMBIN TIME: CPT | Mod: ORL

## 2024-02-28 PROCEDURE — P9603 ONE-WAY ALLOW PRORATED MILES: HCPCS | Mod: ORL

## 2024-03-05 ENCOUNTER — LAB REQUISITION (OUTPATIENT)
Dept: LAB | Facility: CLINIC | Age: 89
End: 2024-03-05
Payer: MEDICARE

## 2024-03-05 DIAGNOSIS — I48.20 CHRONIC ATRIAL FIBRILLATION, UNSPECIFIED (H): ICD-10-CM

## 2024-03-06 LAB — INR PPP: 3.29 (ref 0.85–1.15)

## 2024-03-06 PROCEDURE — 85610 PROTHROMBIN TIME: CPT | Mod: ORL

## 2024-03-06 PROCEDURE — P9603 ONE-WAY ALLOW PRORATED MILES: HCPCS | Mod: ORL

## 2024-03-06 PROCEDURE — 36415 COLL VENOUS BLD VENIPUNCTURE: CPT | Mod: ORL

## 2024-03-12 ENCOUNTER — LAB REQUISITION (OUTPATIENT)
Dept: LAB | Facility: CLINIC | Age: 89
End: 2024-03-12
Payer: MEDICARE

## 2024-03-12 DIAGNOSIS — I48.20 CHRONIC ATRIAL FIBRILLATION, UNSPECIFIED (H): ICD-10-CM

## 2024-03-13 LAB — INR PPP: 3.49 (ref 0.85–1.15)

## 2024-03-13 PROCEDURE — 85610 PROTHROMBIN TIME: CPT | Mod: ORL

## 2024-03-13 PROCEDURE — 36415 COLL VENOUS BLD VENIPUNCTURE: CPT | Mod: ORL

## 2024-03-13 PROCEDURE — P9603 ONE-WAY ALLOW PRORATED MILES: HCPCS | Mod: ORL

## 2024-03-19 ENCOUNTER — LAB REQUISITION (OUTPATIENT)
Dept: LAB | Facility: CLINIC | Age: 89
End: 2024-03-19
Payer: MEDICARE

## 2024-03-19 DIAGNOSIS — I48.20 CHRONIC ATRIAL FIBRILLATION, UNSPECIFIED (H): ICD-10-CM

## 2024-03-20 LAB — INR PPP: 2.73 (ref 0.85–1.15)

## 2024-03-20 PROCEDURE — P9603 ONE-WAY ALLOW PRORATED MILES: HCPCS | Mod: ORL

## 2024-03-20 PROCEDURE — 36415 COLL VENOUS BLD VENIPUNCTURE: CPT | Mod: ORL

## 2024-03-20 PROCEDURE — 85610 PROTHROMBIN TIME: CPT | Mod: ORL

## 2024-03-26 ENCOUNTER — LAB REQUISITION (OUTPATIENT)
Dept: LAB | Facility: CLINIC | Age: 89
End: 2024-03-26
Payer: MEDICARE

## 2024-03-26 DIAGNOSIS — I48.20 CHRONIC ATRIAL FIBRILLATION, UNSPECIFIED (H): ICD-10-CM

## 2024-03-27 LAB — INR PPP: 2.57 (ref 0.85–1.15)

## 2024-03-27 PROCEDURE — P9603 ONE-WAY ALLOW PRORATED MILES: HCPCS | Mod: ORL

## 2024-03-27 PROCEDURE — 36415 COLL VENOUS BLD VENIPUNCTURE: CPT | Mod: ORL

## 2024-03-27 PROCEDURE — 85610 PROTHROMBIN TIME: CPT | Mod: ORL

## 2024-04-02 ENCOUNTER — LAB REQUISITION (OUTPATIENT)
Dept: LAB | Facility: CLINIC | Age: 89
End: 2024-04-02
Payer: MEDICARE

## 2024-04-02 DIAGNOSIS — I48.20 CHRONIC ATRIAL FIBRILLATION, UNSPECIFIED (H): ICD-10-CM

## 2024-04-03 LAB — INR PPP: 2.94 (ref 0.85–1.15)

## 2024-04-03 PROCEDURE — P9603 ONE-WAY ALLOW PRORATED MILES: HCPCS | Mod: ORL

## 2024-04-03 PROCEDURE — 85610 PROTHROMBIN TIME: CPT | Mod: ORL

## 2024-04-03 PROCEDURE — 36415 COLL VENOUS BLD VENIPUNCTURE: CPT | Mod: ORL

## 2024-04-09 ENCOUNTER — LAB REQUISITION (OUTPATIENT)
Dept: LAB | Facility: CLINIC | Age: 89
End: 2024-04-09
Payer: MEDICARE

## 2024-04-09 DIAGNOSIS — I48.20 CHRONIC ATRIAL FIBRILLATION, UNSPECIFIED (H): ICD-10-CM

## 2024-04-10 LAB — INR PPP: 2.49 (ref 0.85–1.15)

## 2024-04-10 PROCEDURE — P9604 ONE-WAY ALLOW PRORATED TRIP: HCPCS | Mod: ORL | Performed by: PHYSICIAN ASSISTANT

## 2024-04-10 PROCEDURE — 36415 COLL VENOUS BLD VENIPUNCTURE: CPT | Mod: ORL | Performed by: PHYSICIAN ASSISTANT

## 2024-04-10 PROCEDURE — 85610 PROTHROMBIN TIME: CPT | Mod: ORL | Performed by: PHYSICIAN ASSISTANT

## 2024-04-23 ENCOUNTER — LAB REQUISITION (OUTPATIENT)
Dept: LAB | Facility: CLINIC | Age: 89
End: 2024-04-23
Payer: MEDICARE

## 2024-04-23 DIAGNOSIS — I48.20 CHRONIC ATRIAL FIBRILLATION, UNSPECIFIED (H): ICD-10-CM

## 2024-04-24 LAB — INR PPP: 2.24 (ref 0.85–1.15)

## 2024-04-24 PROCEDURE — 36415 COLL VENOUS BLD VENIPUNCTURE: CPT | Mod: ORL | Performed by: PHYSICIAN ASSISTANT

## 2024-04-24 PROCEDURE — P9603 ONE-WAY ALLOW PRORATED MILES: HCPCS | Mod: ORL | Performed by: PHYSICIAN ASSISTANT

## 2024-04-24 PROCEDURE — 85610 PROTHROMBIN TIME: CPT | Mod: ORL | Performed by: PHYSICIAN ASSISTANT

## 2024-05-07 ENCOUNTER — LAB REQUISITION (OUTPATIENT)
Dept: LAB | Facility: CLINIC | Age: 89
End: 2024-05-07
Payer: MEDICARE

## 2024-05-07 DIAGNOSIS — I48.20 CHRONIC ATRIAL FIBRILLATION, UNSPECIFIED (H): ICD-10-CM

## 2024-05-08 LAB — INR PPP: 1.87 (ref 0.85–1.15)

## 2024-05-08 PROCEDURE — 85610 PROTHROMBIN TIME: CPT | Mod: ORL | Performed by: PHYSICIAN ASSISTANT

## 2024-05-08 PROCEDURE — P9603 ONE-WAY ALLOW PRORATED MILES: HCPCS | Mod: ORL | Performed by: PHYSICIAN ASSISTANT

## 2024-05-08 PROCEDURE — 36415 COLL VENOUS BLD VENIPUNCTURE: CPT | Mod: ORL | Performed by: PHYSICIAN ASSISTANT

## 2024-05-14 ENCOUNTER — LAB REQUISITION (OUTPATIENT)
Dept: LAB | Facility: CLINIC | Age: 89
End: 2024-05-14
Payer: MEDICARE

## 2024-05-14 DIAGNOSIS — I48.20 CHRONIC ATRIAL FIBRILLATION, UNSPECIFIED (H): ICD-10-CM

## 2024-05-15 LAB — INR PPP: 2.85 (ref 0.85–1.15)

## 2024-05-15 PROCEDURE — P9603 ONE-WAY ALLOW PRORATED MILES: HCPCS | Mod: ORL | Performed by: PHYSICIAN ASSISTANT

## 2024-05-15 PROCEDURE — 36415 COLL VENOUS BLD VENIPUNCTURE: CPT | Mod: ORL | Performed by: PHYSICIAN ASSISTANT

## 2024-05-15 PROCEDURE — 85610 PROTHROMBIN TIME: CPT | Mod: ORL | Performed by: PHYSICIAN ASSISTANT

## 2024-05-28 ENCOUNTER — LAB REQUISITION (OUTPATIENT)
Dept: LAB | Facility: CLINIC | Age: 89
End: 2024-05-28
Payer: MEDICARE

## 2024-05-28 DIAGNOSIS — I48.20 CHRONIC ATRIAL FIBRILLATION, UNSPECIFIED (H): ICD-10-CM

## 2024-05-29 LAB — INR PPP: 3.39 (ref 0.85–1.15)

## 2024-05-29 PROCEDURE — 85610 PROTHROMBIN TIME: CPT | Mod: ORL | Performed by: PHYSICIAN ASSISTANT

## 2024-05-29 PROCEDURE — P9603 ONE-WAY ALLOW PRORATED MILES: HCPCS | Mod: ORL | Performed by: PHYSICIAN ASSISTANT

## 2024-05-29 PROCEDURE — 36415 COLL VENOUS BLD VENIPUNCTURE: CPT | Mod: ORL | Performed by: PHYSICIAN ASSISTANT

## 2024-06-04 ENCOUNTER — LAB REQUISITION (OUTPATIENT)
Dept: LAB | Facility: CLINIC | Age: 89
End: 2024-06-04
Payer: MEDICARE

## 2024-06-04 DIAGNOSIS — I48.20 CHRONIC ATRIAL FIBRILLATION, UNSPECIFIED (H): ICD-10-CM

## 2024-06-05 LAB — INR PPP: 3.35 (ref 0.85–1.15)

## 2024-06-05 PROCEDURE — P9603 ONE-WAY ALLOW PRORATED MILES: HCPCS | Mod: ORL | Performed by: PHYSICIAN ASSISTANT

## 2024-06-05 PROCEDURE — 36415 COLL VENOUS BLD VENIPUNCTURE: CPT | Mod: ORL | Performed by: PHYSICIAN ASSISTANT

## 2024-06-05 PROCEDURE — 85610 PROTHROMBIN TIME: CPT | Mod: ORL | Performed by: PHYSICIAN ASSISTANT

## 2024-06-11 ENCOUNTER — LAB REQUISITION (OUTPATIENT)
Dept: LAB | Facility: CLINIC | Age: 89
End: 2024-06-11
Payer: MEDICARE

## 2024-06-11 DIAGNOSIS — I48.20 CHRONIC ATRIAL FIBRILLATION, UNSPECIFIED (H): ICD-10-CM

## 2024-06-12 LAB — INR PPP: 3.21 (ref 0.85–1.15)

## 2024-06-12 PROCEDURE — P9603 ONE-WAY ALLOW PRORATED MILES: HCPCS | Mod: ORL

## 2024-06-12 PROCEDURE — 85610 PROTHROMBIN TIME: CPT | Mod: ORL

## 2024-06-12 PROCEDURE — 36415 COLL VENOUS BLD VENIPUNCTURE: CPT | Mod: ORL

## 2024-06-20 ENCOUNTER — LAB REQUISITION (OUTPATIENT)
Dept: LAB | Facility: CLINIC | Age: 89
End: 2024-06-20
Payer: MEDICARE

## 2024-06-20 DIAGNOSIS — I48.20 CHRONIC ATRIAL FIBRILLATION, UNSPECIFIED (H): ICD-10-CM

## 2024-06-21 LAB — INR PPP: 2.09 (ref 0.85–1.15)

## 2024-06-21 PROCEDURE — 36415 COLL VENOUS BLD VENIPUNCTURE: CPT | Mod: ORL

## 2024-06-21 PROCEDURE — P9603 ONE-WAY ALLOW PRORATED MILES: HCPCS | Mod: ORL

## 2024-06-21 PROCEDURE — 85610 PROTHROMBIN TIME: CPT | Mod: ORL

## 2024-06-27 ENCOUNTER — LAB REQUISITION (OUTPATIENT)
Dept: LAB | Facility: CLINIC | Age: 89
End: 2024-06-27
Payer: MEDICARE

## 2024-06-27 DIAGNOSIS — I48.20 CHRONIC ATRIAL FIBRILLATION, UNSPECIFIED (H): ICD-10-CM

## 2024-07-01 LAB — INR PPP: 1.95 (ref 0.85–1.15)

## 2024-07-01 PROCEDURE — 85610 PROTHROMBIN TIME: CPT | Mod: ORL

## 2024-07-01 PROCEDURE — 36415 COLL VENOUS BLD VENIPUNCTURE: CPT | Mod: ORL

## 2024-07-01 PROCEDURE — P9603 ONE-WAY ALLOW PRORATED MILES: HCPCS | Mod: ORL

## 2024-07-02 ENCOUNTER — LAB REQUISITION (OUTPATIENT)
Dept: LAB | Facility: CLINIC | Age: 89
End: 2024-07-02
Payer: MEDICARE

## 2024-07-02 DIAGNOSIS — I10 ESSENTIAL (PRIMARY) HYPERTENSION: ICD-10-CM

## 2024-07-02 DIAGNOSIS — F33.41 MAJOR DEPRESSIVE DISORDER, RECURRENT, IN PARTIAL REMISSION (H): ICD-10-CM

## 2024-07-03 LAB
ANION GAP SERPL CALCULATED.3IONS-SCNC: 10 MMOL/L (ref 7–15)
BUN SERPL-MCNC: 12.7 MG/DL (ref 8–23)
CALCIUM SERPL-MCNC: 8.8 MG/DL (ref 8.2–9.6)
CHLORIDE SERPL-SCNC: 107 MMOL/L (ref 98–107)
CREAT SERPL-MCNC: 0.89 MG/DL (ref 0.51–0.95)
DEPRECATED HCO3 PLAS-SCNC: 26 MMOL/L (ref 22–29)
EGFRCR SERPLBLD CKD-EPI 2021: 61 ML/MIN/1.73M2
GLUCOSE SERPL-MCNC: 85 MG/DL (ref 70–99)
POTASSIUM SERPL-SCNC: 3.4 MMOL/L (ref 3.4–5.3)
SODIUM SERPL-SCNC: 143 MMOL/L (ref 135–145)
TSH SERPL DL<=0.005 MIU/L-ACNC: 2.43 UIU/ML (ref 0.3–4.2)
VIT B12 SERPL-MCNC: 650 PG/ML (ref 232–1245)
VIT D+METAB SERPL-MCNC: 23 NG/ML (ref 20–50)

## 2024-07-03 PROCEDURE — 36415 COLL VENOUS BLD VENIPUNCTURE: CPT | Mod: ORL

## 2024-07-03 PROCEDURE — P9603 ONE-WAY ALLOW PRORATED MILES: HCPCS | Mod: ORL

## 2024-07-03 PROCEDURE — 82607 VITAMIN B-12: CPT | Mod: ORL

## 2024-07-03 PROCEDURE — 82306 VITAMIN D 25 HYDROXY: CPT | Mod: ORL

## 2024-07-03 PROCEDURE — 84443 ASSAY THYROID STIM HORMONE: CPT | Mod: ORL

## 2024-07-03 PROCEDURE — 80048 BASIC METABOLIC PNL TOTAL CA: CPT | Mod: ORL

## 2024-07-07 ENCOUNTER — LAB REQUISITION (OUTPATIENT)
Dept: LAB | Facility: CLINIC | Age: 89
End: 2024-07-07
Payer: MEDICARE

## 2024-07-07 DIAGNOSIS — I48.20 CHRONIC ATRIAL FIBRILLATION, UNSPECIFIED (H): ICD-10-CM

## 2024-07-08 LAB — INR PPP: 1.94 (ref 0.85–1.15)

## 2024-07-08 PROCEDURE — 85610 PROTHROMBIN TIME: CPT | Mod: ORL

## 2024-07-08 PROCEDURE — 36415 COLL VENOUS BLD VENIPUNCTURE: CPT | Mod: ORL

## 2024-07-08 PROCEDURE — P9603 ONE-WAY ALLOW PRORATED MILES: HCPCS | Mod: ORL

## 2024-07-14 ENCOUNTER — LAB REQUISITION (OUTPATIENT)
Dept: LAB | Facility: CLINIC | Age: 89
End: 2024-07-14
Payer: MEDICARE

## 2024-07-14 DIAGNOSIS — I48.20 CHRONIC ATRIAL FIBRILLATION, UNSPECIFIED (H): ICD-10-CM

## 2024-07-14 DIAGNOSIS — D64.9 ANEMIA, UNSPECIFIED: ICD-10-CM

## 2024-07-15 LAB
ERYTHROCYTE [DISTWIDTH] IN BLOOD BY AUTOMATED COUNT: 24.8 % (ref 10–15)
HCT VFR BLD AUTO: 29.9 % (ref 35–47)
HGB BLD-MCNC: 9.3 G/DL (ref 11.7–15.7)
INR PPP: 2.16 (ref 0.85–1.15)
MCH RBC QN AUTO: 26.2 PG (ref 26.5–33)
MCHC RBC AUTO-ENTMCNC: 31.1 G/DL (ref 31.5–36.5)
MCV RBC AUTO: 84 FL (ref 78–100)
PLATELET # BLD AUTO: 313 10E3/UL (ref 150–450)
RBC # BLD AUTO: 3.55 10E6/UL (ref 3.8–5.2)
WBC # BLD AUTO: 6 10E3/UL (ref 4–11)

## 2024-07-15 PROCEDURE — 85610 PROTHROMBIN TIME: CPT | Mod: ORL

## 2024-07-15 PROCEDURE — 85027 COMPLETE CBC AUTOMATED: CPT | Mod: ORL

## 2024-07-15 PROCEDURE — 36415 COLL VENOUS BLD VENIPUNCTURE: CPT | Mod: ORL

## 2024-07-15 PROCEDURE — P9603 ONE-WAY ALLOW PRORATED MILES: HCPCS | Mod: ORL

## 2024-07-21 ENCOUNTER — LAB REQUISITION (OUTPATIENT)
Dept: LAB | Facility: CLINIC | Age: 89
End: 2024-07-21
Payer: MEDICARE

## 2024-07-21 DIAGNOSIS — I48.20 CHRONIC ATRIAL FIBRILLATION, UNSPECIFIED (H): ICD-10-CM

## 2024-07-22 LAB — INR PPP: 2.42 (ref 0.85–1.15)

## 2024-07-22 PROCEDURE — P9603 ONE-WAY ALLOW PRORATED MILES: HCPCS | Mod: ORL

## 2024-07-22 PROCEDURE — 36415 COLL VENOUS BLD VENIPUNCTURE: CPT | Mod: ORL

## 2024-07-22 PROCEDURE — 85610 PROTHROMBIN TIME: CPT | Mod: ORL

## 2024-07-28 ENCOUNTER — LAB REQUISITION (OUTPATIENT)
Dept: LAB | Facility: CLINIC | Age: 89
End: 2024-07-28
Payer: MEDICARE

## 2024-07-28 DIAGNOSIS — I48.20 CHRONIC ATRIAL FIBRILLATION, UNSPECIFIED (H): ICD-10-CM

## 2024-07-29 LAB — INR PPP: 2.15 (ref 0.85–1.15)

## 2024-07-29 PROCEDURE — P9603 ONE-WAY ALLOW PRORATED MILES: HCPCS | Mod: ORL

## 2024-07-29 PROCEDURE — 85610 PROTHROMBIN TIME: CPT | Mod: ORL

## 2024-07-29 PROCEDURE — 36415 COLL VENOUS BLD VENIPUNCTURE: CPT | Mod: ORL

## 2024-08-11 ENCOUNTER — LAB REQUISITION (OUTPATIENT)
Dept: LAB | Facility: CLINIC | Age: 89
End: 2024-08-11
Payer: MEDICARE

## 2024-08-11 DIAGNOSIS — I48.20 CHRONIC ATRIAL FIBRILLATION, UNSPECIFIED (H): ICD-10-CM

## 2024-08-12 LAB — INR PPP: 3.45 (ref 0.85–1.15)

## 2024-08-12 PROCEDURE — P9603 ONE-WAY ALLOW PRORATED MILES: HCPCS | Mod: ORL

## 2024-08-12 PROCEDURE — 85610 PROTHROMBIN TIME: CPT | Mod: ORL

## 2024-08-12 PROCEDURE — 36415 COLL VENOUS BLD VENIPUNCTURE: CPT | Mod: ORL

## 2024-08-18 ENCOUNTER — LAB REQUISITION (OUTPATIENT)
Dept: LAB | Facility: CLINIC | Age: 89
End: 2024-08-18
Payer: MEDICARE

## 2024-08-18 DIAGNOSIS — I48.20 CHRONIC ATRIAL FIBRILLATION, UNSPECIFIED (H): ICD-10-CM

## 2024-08-19 LAB — INR PPP: 3.2 (ref 0.85–1.15)

## 2024-08-19 PROCEDURE — 36415 COLL VENOUS BLD VENIPUNCTURE: CPT | Mod: ORL

## 2024-08-19 PROCEDURE — P9603 ONE-WAY ALLOW PRORATED MILES: HCPCS | Mod: ORL

## 2024-08-19 PROCEDURE — 85610 PROTHROMBIN TIME: CPT | Mod: ORL

## 2024-08-25 ENCOUNTER — LAB REQUISITION (OUTPATIENT)
Dept: LAB | Facility: CLINIC | Age: 89
End: 2024-08-25
Payer: MEDICARE

## 2024-08-25 DIAGNOSIS — I48.20 CHRONIC ATRIAL FIBRILLATION, UNSPECIFIED (H): ICD-10-CM

## 2024-08-26 LAB — INR PPP: 2.31 (ref 0.85–1.15)

## 2024-08-26 PROCEDURE — P9604 ONE-WAY ALLOW PRORATED TRIP: HCPCS | Mod: ORL

## 2024-08-26 PROCEDURE — 85610 PROTHROMBIN TIME: CPT | Mod: ORL

## 2024-08-26 PROCEDURE — 36415 COLL VENOUS BLD VENIPUNCTURE: CPT | Mod: ORL

## 2024-08-29 ENCOUNTER — LAB REQUISITION (OUTPATIENT)
Dept: LAB | Facility: CLINIC | Age: 89
End: 2024-08-29
Payer: MEDICARE

## 2024-08-29 DIAGNOSIS — D64.9 ANEMIA, UNSPECIFIED: ICD-10-CM

## 2024-08-30 LAB
ALBUMIN SERPL BCG-MCNC: 3.6 G/DL (ref 3.5–5.2)
ALP SERPL-CCNC: 83 U/L (ref 40–150)
ALT SERPL W P-5'-P-CCNC: 13 U/L (ref 0–50)
ANION GAP SERPL CALCULATED.3IONS-SCNC: 9 MMOL/L (ref 7–15)
AST SERPL W P-5'-P-CCNC: 22 U/L (ref 0–45)
BILIRUB SERPL-MCNC: 0.3 MG/DL
BUN SERPL-MCNC: 16.5 MG/DL (ref 8–23)
CALCIUM SERPL-MCNC: 8.8 MG/DL (ref 8.8–10.4)
CHLORIDE SERPL-SCNC: 100 MMOL/L (ref 98–107)
CREAT SERPL-MCNC: 1.05 MG/DL (ref 0.51–0.95)
EGFRCR SERPLBLD CKD-EPI 2021: 50 ML/MIN/1.73M2
ERYTHROCYTE [DISTWIDTH] IN BLOOD BY AUTOMATED COUNT: 19.4 % (ref 10–15)
GLUCOSE SERPL-MCNC: 123 MG/DL (ref 70–99)
HCO3 SERPL-SCNC: 28 MMOL/L (ref 22–29)
HCT VFR BLD AUTO: 32.6 % (ref 35–47)
HGB BLD-MCNC: 10.5 G/DL (ref 11.7–15.7)
MCH RBC QN AUTO: 28.1 PG (ref 26.5–33)
MCHC RBC AUTO-ENTMCNC: 32.2 G/DL (ref 31.5–36.5)
MCV RBC AUTO: 87 FL (ref 78–100)
PLATELET # BLD AUTO: 228 10E3/UL (ref 150–450)
POTASSIUM SERPL-SCNC: 4.1 MMOL/L (ref 3.4–5.3)
PROT SERPL-MCNC: 5.8 G/DL (ref 6.4–8.3)
RBC # BLD AUTO: 3.74 10E6/UL (ref 3.8–5.2)
SODIUM SERPL-SCNC: 137 MMOL/L (ref 135–145)
WBC # BLD AUTO: 5.5 10E3/UL (ref 4–11)

## 2024-08-30 PROCEDURE — 36415 COLL VENOUS BLD VENIPUNCTURE: CPT | Mod: ORL

## 2024-08-30 PROCEDURE — P9603 ONE-WAY ALLOW PRORATED MILES: HCPCS | Mod: ORL

## 2024-08-30 PROCEDURE — 80053 COMPREHEN METABOLIC PANEL: CPT | Mod: ORL

## 2024-08-30 PROCEDURE — 85027 COMPLETE CBC AUTOMATED: CPT | Mod: ORL

## 2024-09-03 ENCOUNTER — LAB REQUISITION (OUTPATIENT)
Dept: LAB | Facility: CLINIC | Age: 89
End: 2024-09-03
Payer: MEDICARE

## 2024-09-03 DIAGNOSIS — I48.20 CHRONIC ATRIAL FIBRILLATION, UNSPECIFIED (H): ICD-10-CM

## 2024-09-06 LAB — INR PPP: 2.41 (ref 0.85–1.15)

## 2024-09-06 PROCEDURE — 36415 COLL VENOUS BLD VENIPUNCTURE: CPT | Mod: ORL

## 2024-09-06 PROCEDURE — P9603 ONE-WAY ALLOW PRORATED MILES: HCPCS | Mod: ORL

## 2024-09-06 PROCEDURE — 85610 PROTHROMBIN TIME: CPT | Mod: ORL

## 2024-09-12 ENCOUNTER — LAB REQUISITION (OUTPATIENT)
Dept: LAB | Facility: CLINIC | Age: 89
End: 2024-09-12
Payer: MEDICARE

## 2024-09-12 DIAGNOSIS — I48.20 CHRONIC ATRIAL FIBRILLATION, UNSPECIFIED (H): ICD-10-CM

## 2024-09-13 LAB — INR PPP: 1.83 (ref 0.85–1.15)

## 2024-09-13 PROCEDURE — 85610 PROTHROMBIN TIME: CPT | Mod: ORL

## 2024-09-13 PROCEDURE — P9603 ONE-WAY ALLOW PRORATED MILES: HCPCS | Mod: ORL

## 2024-09-13 PROCEDURE — 36415 COLL VENOUS BLD VENIPUNCTURE: CPT | Mod: ORL

## 2024-09-19 ENCOUNTER — LAB REQUISITION (OUTPATIENT)
Dept: LAB | Facility: CLINIC | Age: 89
End: 2024-09-19
Payer: MEDICARE

## 2024-09-19 DIAGNOSIS — I48.20 CHRONIC ATRIAL FIBRILLATION, UNSPECIFIED (H): ICD-10-CM

## 2024-09-20 LAB — INR PPP: 2.1 (ref 0.85–1.15)

## 2024-09-20 PROCEDURE — 36415 COLL VENOUS BLD VENIPUNCTURE: CPT | Mod: ORL

## 2024-09-20 PROCEDURE — 85610 PROTHROMBIN TIME: CPT | Mod: ORL

## 2024-09-20 PROCEDURE — P9603 ONE-WAY ALLOW PRORATED MILES: HCPCS | Mod: ORL

## 2024-09-26 ENCOUNTER — LAB REQUISITION (OUTPATIENT)
Dept: LAB | Facility: CLINIC | Age: 89
End: 2024-09-26
Payer: MEDICARE

## 2024-09-26 DIAGNOSIS — I48.20 CHRONIC ATRIAL FIBRILLATION, UNSPECIFIED (H): ICD-10-CM

## 2024-09-27 LAB — INR PPP: 1.86 (ref 0.85–1.15)

## 2024-09-27 PROCEDURE — 36415 COLL VENOUS BLD VENIPUNCTURE: CPT | Mod: ORL

## 2024-09-27 PROCEDURE — P9603 ONE-WAY ALLOW PRORATED MILES: HCPCS | Mod: ORL

## 2024-09-27 PROCEDURE — 85610 PROTHROMBIN TIME: CPT | Mod: ORL

## 2024-10-03 ENCOUNTER — LAB REQUISITION (OUTPATIENT)
Dept: LAB | Facility: CLINIC | Age: 89
End: 2024-10-03
Payer: MEDICARE

## 2024-10-03 DIAGNOSIS — I48.20 CHRONIC ATRIAL FIBRILLATION, UNSPECIFIED (H): ICD-10-CM

## 2024-10-04 LAB — INR PPP: 1.94 (ref 0.85–1.15)

## 2024-10-04 PROCEDURE — 85610 PROTHROMBIN TIME: CPT | Mod: ORL

## 2024-10-04 PROCEDURE — 36415 COLL VENOUS BLD VENIPUNCTURE: CPT | Mod: ORL

## 2024-10-04 PROCEDURE — P9603 ONE-WAY ALLOW PRORATED MILES: HCPCS | Mod: ORL

## 2024-10-10 ENCOUNTER — LAB REQUISITION (OUTPATIENT)
Dept: LAB | Facility: CLINIC | Age: 89
End: 2024-10-10
Payer: MEDICARE

## 2024-10-10 DIAGNOSIS — I48.20 CHRONIC ATRIAL FIBRILLATION, UNSPECIFIED (H): ICD-10-CM

## 2024-10-11 LAB — INR PPP: >10 (ref 0.85–1.15)

## 2024-10-11 PROCEDURE — P9603 ONE-WAY ALLOW PRORATED MILES: HCPCS | Mod: ORL

## 2024-10-11 PROCEDURE — 36415 COLL VENOUS BLD VENIPUNCTURE: CPT | Mod: ORL

## 2024-10-11 PROCEDURE — 85610 PROTHROMBIN TIME: CPT | Mod: ORL

## 2024-10-13 ENCOUNTER — LAB REQUISITION (OUTPATIENT)
Dept: LAB | Facility: CLINIC | Age: 89
End: 2024-10-13
Payer: MEDICARE

## 2024-10-13 DIAGNOSIS — I48.20 CHRONIC ATRIAL FIBRILLATION, UNSPECIFIED (H): ICD-10-CM

## 2024-10-14 LAB — INR PPP: 2.45 (ref 0.85–1.15)

## 2024-10-14 PROCEDURE — P9603 ONE-WAY ALLOW PRORATED MILES: HCPCS | Mod: ORL

## 2024-10-14 PROCEDURE — 85610 PROTHROMBIN TIME: CPT | Mod: ORL

## 2024-10-14 PROCEDURE — 36415 COLL VENOUS BLD VENIPUNCTURE: CPT | Mod: ORL

## 2024-10-20 ENCOUNTER — LAB REQUISITION (OUTPATIENT)
Dept: LAB | Facility: CLINIC | Age: 89
End: 2024-10-20
Payer: MEDICARE

## 2024-10-20 DIAGNOSIS — R53.83 OTHER FATIGUE: ICD-10-CM

## 2024-10-20 DIAGNOSIS — I48.20 CHRONIC ATRIAL FIBRILLATION, UNSPECIFIED (H): ICD-10-CM

## 2024-10-21 LAB
ANION GAP SERPL CALCULATED.3IONS-SCNC: 9 MMOL/L (ref 7–15)
BUN SERPL-MCNC: 18.7 MG/DL (ref 8–23)
CALCIUM SERPL-MCNC: 9.3 MG/DL (ref 8.8–10.4)
CHLORIDE SERPL-SCNC: 106 MMOL/L (ref 98–107)
CREAT SERPL-MCNC: 0.86 MG/DL (ref 0.51–0.95)
EGFRCR SERPLBLD CKD-EPI 2021: 63 ML/MIN/1.73M2
ERYTHROCYTE [DISTWIDTH] IN BLOOD BY AUTOMATED COUNT: 15.7 % (ref 10–15)
HCO3 SERPL-SCNC: 28 MMOL/L (ref 22–29)
HCT VFR BLD AUTO: 37.6 % (ref 35–47)
HGB BLD-MCNC: 11.5 G/DL (ref 11.7–15.7)
INR PPP: 2.7 (ref 0.85–1.15)
MCH RBC QN AUTO: 27.8 PG (ref 26.5–33)
MCHC RBC AUTO-ENTMCNC: 30.6 G/DL (ref 31.5–36.5)
MCV RBC AUTO: 91 FL (ref 78–100)
PLATELET # BLD AUTO: 217 10E3/UL (ref 150–450)
POTASSIUM SERPL-SCNC: 4.2 MMOL/L (ref 3.4–5.3)
RBC # BLD AUTO: 4.13 10E6/UL (ref 3.8–5.2)
SODIUM SERPL-SCNC: 143 MMOL/L (ref 135–145)
TSH SERPL DL<=0.005 MIU/L-ACNC: 4.31 UIU/ML (ref 0.3–4.2)
WBC # BLD AUTO: 8 10E3/UL (ref 4–11)

## 2024-10-21 PROCEDURE — 85610 PROTHROMBIN TIME: CPT | Mod: ORL

## 2024-10-21 PROCEDURE — P9603 ONE-WAY ALLOW PRORATED MILES: HCPCS | Mod: ORL

## 2024-10-21 PROCEDURE — 84443 ASSAY THYROID STIM HORMONE: CPT | Mod: ORL

## 2024-10-21 PROCEDURE — 36415 COLL VENOUS BLD VENIPUNCTURE: CPT | Mod: ORL

## 2024-10-21 PROCEDURE — 85027 COMPLETE CBC AUTOMATED: CPT | Mod: ORL

## 2024-10-21 PROCEDURE — 80051 ELECTROLYTE PANEL: CPT | Mod: ORL

## 2024-10-27 ENCOUNTER — LAB REQUISITION (OUTPATIENT)
Dept: LAB | Facility: CLINIC | Age: 89
End: 2024-10-27
Payer: MEDICARE

## 2024-10-27 DIAGNOSIS — I48.20 CHRONIC ATRIAL FIBRILLATION, UNSPECIFIED (H): ICD-10-CM

## 2024-10-28 LAB — INR PPP: 2.91 (ref 0.85–1.15)

## 2024-10-28 PROCEDURE — P9603 ONE-WAY ALLOW PRORATED MILES: HCPCS | Mod: ORL

## 2024-10-28 PROCEDURE — 36415 COLL VENOUS BLD VENIPUNCTURE: CPT | Mod: ORL

## 2024-10-28 PROCEDURE — 85610 PROTHROMBIN TIME: CPT | Mod: ORL

## 2024-11-03 ENCOUNTER — LAB REQUISITION (OUTPATIENT)
Dept: LAB | Facility: CLINIC | Age: 89
End: 2024-11-03
Payer: MEDICARE

## 2024-11-03 DIAGNOSIS — I48.20 CHRONIC ATRIAL FIBRILLATION, UNSPECIFIED (H): ICD-10-CM

## 2024-11-04 LAB — INR PPP: 2.54 (ref 0.85–1.15)

## 2024-11-04 PROCEDURE — 36415 COLL VENOUS BLD VENIPUNCTURE: CPT | Mod: ORL

## 2024-11-04 PROCEDURE — P9603 ONE-WAY ALLOW PRORATED MILES: HCPCS | Mod: ORL

## 2024-11-04 PROCEDURE — 85610 PROTHROMBIN TIME: CPT | Mod: ORL

## 2024-11-17 ENCOUNTER — LAB REQUISITION (OUTPATIENT)
Dept: LAB | Facility: CLINIC | Age: 89
End: 2024-11-17
Payer: MEDICARE

## 2024-11-17 DIAGNOSIS — I48.20 CHRONIC ATRIAL FIBRILLATION, UNSPECIFIED (H): ICD-10-CM

## 2024-11-18 LAB — INR PPP: 2.37 (ref 0.85–1.15)

## 2024-11-18 PROCEDURE — 85610 PROTHROMBIN TIME: CPT | Mod: ORL

## 2024-11-18 PROCEDURE — P9603 ONE-WAY ALLOW PRORATED MILES: HCPCS | Mod: ORL

## 2024-11-18 PROCEDURE — 36415 COLL VENOUS BLD VENIPUNCTURE: CPT | Mod: ORL

## 2024-12-01 ENCOUNTER — LAB REQUISITION (OUTPATIENT)
Dept: LAB | Facility: CLINIC | Age: 89
End: 2024-12-01
Payer: MEDICARE

## 2024-12-01 DIAGNOSIS — I48.20 CHRONIC ATRIAL FIBRILLATION, UNSPECIFIED (H): ICD-10-CM

## 2024-12-02 LAB — INR PPP: 2.56 (ref 0.85–1.15)

## 2024-12-02 PROCEDURE — P9603 ONE-WAY ALLOW PRORATED MILES: HCPCS | Mod: ORL

## 2024-12-02 PROCEDURE — 85610 PROTHROMBIN TIME: CPT | Mod: ORL

## 2024-12-02 PROCEDURE — 36415 COLL VENOUS BLD VENIPUNCTURE: CPT | Mod: ORL

## 2024-12-15 ENCOUNTER — LAB REQUISITION (OUTPATIENT)
Dept: LAB | Facility: CLINIC | Age: 89
End: 2024-12-15
Payer: MEDICARE

## 2024-12-15 DIAGNOSIS — I48.20 CHRONIC ATRIAL FIBRILLATION, UNSPECIFIED (H): ICD-10-CM

## 2024-12-16 LAB — INR PPP: 1.79 (ref 0.85–1.15)

## 2024-12-16 PROCEDURE — 85610 PROTHROMBIN TIME: CPT | Mod: ORL

## 2024-12-16 PROCEDURE — 36415 COLL VENOUS BLD VENIPUNCTURE: CPT | Mod: ORL

## 2024-12-16 PROCEDURE — P9603 ONE-WAY ALLOW PRORATED MILES: HCPCS | Mod: ORL

## 2024-12-22 ENCOUNTER — LAB REQUISITION (OUTPATIENT)
Dept: LAB | Facility: CLINIC | Age: 89
End: 2024-12-22
Payer: MEDICARE

## 2024-12-22 DIAGNOSIS — I48.20 CHRONIC ATRIAL FIBRILLATION, UNSPECIFIED (H): ICD-10-CM

## 2024-12-23 LAB — INR PPP: 2.35 (ref 0.85–1.15)

## 2024-12-23 PROCEDURE — P9603 ONE-WAY ALLOW PRORATED MILES: HCPCS | Mod: ORL

## 2024-12-23 PROCEDURE — 36415 COLL VENOUS BLD VENIPUNCTURE: CPT | Mod: ORL

## 2024-12-23 PROCEDURE — 85610 PROTHROMBIN TIME: CPT | Mod: ORL

## 2025-01-05 ENCOUNTER — LAB REQUISITION (OUTPATIENT)
Dept: LAB | Facility: CLINIC | Age: OVER 89
End: 2025-01-05
Payer: MEDICARE

## 2025-01-05 DIAGNOSIS — I48.20 CHRONIC ATRIAL FIBRILLATION, UNSPECIFIED (H): ICD-10-CM

## 2025-01-06 LAB — INR PPP: 1.92 (ref 0.85–1.15)

## 2025-01-06 PROCEDURE — P9603 ONE-WAY ALLOW PRORATED MILES: HCPCS | Mod: ORL

## 2025-01-06 PROCEDURE — 85610 PROTHROMBIN TIME: CPT | Mod: ORL

## 2025-01-12 ENCOUNTER — LAB REQUISITION (OUTPATIENT)
Dept: LAB | Facility: CLINIC | Age: OVER 89
End: 2025-01-12
Payer: MEDICARE

## 2025-01-12 DIAGNOSIS — I48.20 CHRONIC ATRIAL FIBRILLATION, UNSPECIFIED (H): ICD-10-CM

## 2025-01-13 LAB — INR PPP: 2.32 (ref 0.85–1.15)

## 2025-01-13 PROCEDURE — P9603 ONE-WAY ALLOW PRORATED MILES: HCPCS | Mod: ORL

## 2025-01-13 PROCEDURE — 85610 PROTHROMBIN TIME: CPT | Mod: ORL

## 2025-01-26 ENCOUNTER — LAB REQUISITION (OUTPATIENT)
Dept: LAB | Facility: CLINIC | Age: OVER 89
End: 2025-01-26
Payer: MEDICARE

## 2025-01-26 DIAGNOSIS — I48.20 CHRONIC ATRIAL FIBRILLATION, UNSPECIFIED (H): ICD-10-CM

## 2025-01-27 LAB — INR PPP: 2.49 (ref 0.85–1.15)

## 2025-01-27 PROCEDURE — 36415 COLL VENOUS BLD VENIPUNCTURE: CPT | Mod: ORL

## 2025-01-27 PROCEDURE — 85610 PROTHROMBIN TIME: CPT | Mod: ORL

## 2025-01-27 PROCEDURE — P9603 ONE-WAY ALLOW PRORATED MILES: HCPCS | Mod: ORL

## 2025-02-09 ENCOUNTER — LAB REQUISITION (OUTPATIENT)
Dept: LAB | Facility: CLINIC | Age: OVER 89
End: 2025-02-09
Payer: MEDICARE

## 2025-02-09 DIAGNOSIS — I48.20 CHRONIC ATRIAL FIBRILLATION, UNSPECIFIED (H): ICD-10-CM

## 2025-02-10 LAB — INR PPP: 5.84 (ref 0.85–1.15)

## 2025-02-10 PROCEDURE — 36415 COLL VENOUS BLD VENIPUNCTURE: CPT | Mod: ORL

## 2025-02-10 PROCEDURE — P9603 ONE-WAY ALLOW PRORATED MILES: HCPCS | Mod: ORL

## 2025-02-10 PROCEDURE — 85610 PROTHROMBIN TIME: CPT | Mod: ORL

## 2025-02-11 ENCOUNTER — LAB REQUISITION (OUTPATIENT)
Dept: LAB | Facility: CLINIC | Age: OVER 89
End: 2025-02-11
Payer: MEDICARE

## 2025-02-11 DIAGNOSIS — I48.20 CHRONIC ATRIAL FIBRILLATION, UNSPECIFIED (H): ICD-10-CM

## 2025-02-12 LAB — INR PPP: 2.32 (ref 0.85–1.15)

## 2025-02-12 PROCEDURE — 36415 COLL VENOUS BLD VENIPUNCTURE: CPT | Mod: ORL

## 2025-02-12 PROCEDURE — 85610 PROTHROMBIN TIME: CPT | Mod: ORL

## 2025-02-12 PROCEDURE — P9603 ONE-WAY ALLOW PRORATED MILES: HCPCS | Mod: ORL

## 2025-02-18 ENCOUNTER — LAB REQUISITION (OUTPATIENT)
Dept: LAB | Facility: CLINIC | Age: OVER 89
End: 2025-02-18
Payer: MEDICARE

## 2025-02-18 DIAGNOSIS — I48.20 CHRONIC ATRIAL FIBRILLATION, UNSPECIFIED (H): ICD-10-CM

## 2025-02-19 LAB — INR PPP: 1.58 (ref 0.85–1.15)

## 2025-02-19 PROCEDURE — 36415 COLL VENOUS BLD VENIPUNCTURE: CPT | Mod: ORL

## 2025-02-19 PROCEDURE — P9603 ONE-WAY ALLOW PRORATED MILES: HCPCS | Mod: ORL

## 2025-02-19 PROCEDURE — 85610 PROTHROMBIN TIME: CPT | Mod: ORL

## 2025-02-23 ENCOUNTER — LAB REQUISITION (OUTPATIENT)
Dept: LAB | Facility: CLINIC | Age: OVER 89
End: 2025-02-23
Payer: MEDICARE

## 2025-02-23 DIAGNOSIS — I48.20 CHRONIC ATRIAL FIBRILLATION, UNSPECIFIED (H): ICD-10-CM

## 2025-02-24 LAB — INR PPP: 1.9 (ref 0.85–1.15)

## 2025-02-24 PROCEDURE — 85610 PROTHROMBIN TIME: CPT | Mod: ORL

## 2025-02-24 PROCEDURE — 36415 COLL VENOUS BLD VENIPUNCTURE: CPT | Mod: ORL

## 2025-02-24 PROCEDURE — P9603 ONE-WAY ALLOW PRORATED MILES: HCPCS | Mod: ORL

## 2025-03-02 ENCOUNTER — LAB REQUISITION (OUTPATIENT)
Dept: LAB | Facility: CLINIC | Age: OVER 89
End: 2025-03-02
Payer: MEDICARE

## 2025-03-02 DIAGNOSIS — I48.20 CHRONIC ATRIAL FIBRILLATION, UNSPECIFIED (H): ICD-10-CM

## 2025-03-03 LAB — INR PPP: 3.12 (ref 0.85–1.15)

## 2025-03-03 PROCEDURE — 85610 PROTHROMBIN TIME: CPT | Mod: ORL

## 2025-03-03 PROCEDURE — P9603 ONE-WAY ALLOW PRORATED MILES: HCPCS | Mod: ORL

## 2025-03-03 PROCEDURE — 36415 COLL VENOUS BLD VENIPUNCTURE: CPT | Mod: ORL

## 2025-03-09 ENCOUNTER — LAB REQUISITION (OUTPATIENT)
Dept: LAB | Facility: CLINIC | Age: OVER 89
End: 2025-03-09
Payer: MEDICARE

## 2025-03-09 DIAGNOSIS — I48.20 CHRONIC ATRIAL FIBRILLATION, UNSPECIFIED (H): ICD-10-CM

## 2025-03-10 LAB — INR PPP: 2.69 (ref 0.85–1.15)

## 2025-03-10 PROCEDURE — P9603 ONE-WAY ALLOW PRORATED MILES: HCPCS | Mod: ORL

## 2025-03-10 PROCEDURE — 36415 COLL VENOUS BLD VENIPUNCTURE: CPT | Mod: ORL

## 2025-03-10 PROCEDURE — 85610 PROTHROMBIN TIME: CPT | Mod: ORL

## 2025-03-16 ENCOUNTER — LAB REQUISITION (OUTPATIENT)
Dept: LAB | Facility: CLINIC | Age: OVER 89
End: 2025-03-16
Payer: MEDICARE

## 2025-03-16 DIAGNOSIS — I48.20 CHRONIC ATRIAL FIBRILLATION, UNSPECIFIED (H): ICD-10-CM

## 2025-03-17 LAB — INR PPP: 3.01 (ref 0.85–1.15)

## 2025-03-17 PROCEDURE — P9603 ONE-WAY ALLOW PRORATED MILES: HCPCS | Mod: ORL

## 2025-03-17 PROCEDURE — 85610 PROTHROMBIN TIME: CPT | Mod: ORL

## 2025-03-17 PROCEDURE — 36415 COLL VENOUS BLD VENIPUNCTURE: CPT | Mod: ORL

## 2025-03-23 ENCOUNTER — LAB REQUISITION (OUTPATIENT)
Dept: LAB | Facility: CLINIC | Age: OVER 89
End: 2025-03-23
Payer: MEDICARE

## 2025-03-23 DIAGNOSIS — I48.20 CHRONIC ATRIAL FIBRILLATION, UNSPECIFIED (H): ICD-10-CM

## 2025-03-24 LAB — INR PPP: 2.74 (ref 0.85–1.15)

## 2025-03-24 PROCEDURE — 85610 PROTHROMBIN TIME: CPT | Mod: ORL

## 2025-03-24 PROCEDURE — P9603 ONE-WAY ALLOW PRORATED MILES: HCPCS | Mod: ORL

## 2025-03-24 PROCEDURE — 36415 COLL VENOUS BLD VENIPUNCTURE: CPT | Mod: ORL

## 2025-03-30 ENCOUNTER — LAB REQUISITION (OUTPATIENT)
Dept: LAB | Facility: CLINIC | Age: OVER 89
End: 2025-03-30
Payer: MEDICARE

## 2025-03-30 DIAGNOSIS — I48.20 CHRONIC ATRIAL FIBRILLATION, UNSPECIFIED (H): ICD-10-CM

## 2025-03-31 LAB — INR PPP: 3 (ref 0.85–1.15)

## 2025-03-31 PROCEDURE — 36415 COLL VENOUS BLD VENIPUNCTURE: CPT | Mod: ORL

## 2025-03-31 PROCEDURE — 85610 PROTHROMBIN TIME: CPT | Mod: ORL

## 2025-03-31 PROCEDURE — P9603 ONE-WAY ALLOW PRORATED MILES: HCPCS | Mod: ORL

## 2025-04-06 ENCOUNTER — LAB REQUISITION (OUTPATIENT)
Dept: LAB | Facility: CLINIC | Age: OVER 89
End: 2025-04-06
Payer: MEDICARE

## 2025-04-06 DIAGNOSIS — I48.20 CHRONIC ATRIAL FIBRILLATION, UNSPECIFIED (H): ICD-10-CM

## 2025-04-07 LAB — INR PPP: 2.12 (ref 0.85–1.15)

## 2025-04-07 PROCEDURE — 36415 COLL VENOUS BLD VENIPUNCTURE: CPT | Mod: ORL

## 2025-04-07 PROCEDURE — P9603 ONE-WAY ALLOW PRORATED MILES: HCPCS | Mod: ORL

## 2025-04-07 PROCEDURE — 85610 PROTHROMBIN TIME: CPT | Mod: ORL

## 2025-04-13 ENCOUNTER — LAB REQUISITION (OUTPATIENT)
Dept: LAB | Facility: CLINIC | Age: OVER 89
End: 2025-04-13
Payer: MEDICARE

## 2025-04-13 DIAGNOSIS — I48.20 CHRONIC ATRIAL FIBRILLATION, UNSPECIFIED (H): ICD-10-CM

## 2025-04-14 LAB — INR PPP: 2.05 (ref 0.85–1.15)

## 2025-04-14 PROCEDURE — 85610 PROTHROMBIN TIME: CPT | Mod: ORL

## 2025-04-14 PROCEDURE — 36415 COLL VENOUS BLD VENIPUNCTURE: CPT | Mod: ORL

## 2025-04-14 PROCEDURE — P9603 ONE-WAY ALLOW PRORATED MILES: HCPCS | Mod: ORL

## 2025-04-27 ENCOUNTER — LAB REQUISITION (OUTPATIENT)
Dept: LAB | Facility: CLINIC | Age: OVER 89
End: 2025-04-27
Payer: MEDICARE

## 2025-04-27 DIAGNOSIS — I48.20 CHRONIC ATRIAL FIBRILLATION, UNSPECIFIED (H): ICD-10-CM

## 2025-04-28 LAB — INR PPP: 2.42 (ref 0.85–1.15)

## 2025-04-28 PROCEDURE — 85610 PROTHROMBIN TIME: CPT | Mod: ORL

## 2025-04-28 PROCEDURE — P9603 ONE-WAY ALLOW PRORATED MILES: HCPCS | Mod: ORL

## 2025-04-28 PROCEDURE — 36415 COLL VENOUS BLD VENIPUNCTURE: CPT | Mod: ORL

## 2025-05-18 ENCOUNTER — LAB REQUISITION (OUTPATIENT)
Dept: LAB | Facility: CLINIC | Age: OVER 89
End: 2025-05-18
Payer: MEDICARE

## 2025-05-18 DIAGNOSIS — I48.20 CHRONIC ATRIAL FIBRILLATION, UNSPECIFIED (H): ICD-10-CM

## 2025-05-19 LAB
INR PPP: 2.66 (ref 0.85–1.15)
PROTHROMBIN TIME: 27.5 SECONDS (ref 11.8–14.8)

## 2025-05-19 PROCEDURE — 36415 COLL VENOUS BLD VENIPUNCTURE: CPT | Mod: ORL

## 2025-05-19 PROCEDURE — 85610 PROTHROMBIN TIME: CPT | Mod: ORL

## 2025-05-19 PROCEDURE — P9603 ONE-WAY ALLOW PRORATED MILES: HCPCS | Mod: ORL

## 2025-06-12 ENCOUNTER — LAB REQUISITION (OUTPATIENT)
Dept: LAB | Facility: CLINIC | Age: OVER 89
End: 2025-06-12
Payer: MEDICARE

## 2025-06-12 DIAGNOSIS — N18.2 CHRONIC KIDNEY DISEASE, STAGE 2 (MILD): ICD-10-CM

## 2025-06-12 DIAGNOSIS — D64.9 ANEMIA, UNSPECIFIED: ICD-10-CM

## 2025-06-12 DIAGNOSIS — E55.9 VITAMIN D DEFICIENCY, UNSPECIFIED: ICD-10-CM

## 2025-06-12 DIAGNOSIS — R41.89 OTHER SYMPTOMS AND SIGNS INVOLVING COGNITIVE FUNCTIONS AND AWARENESS: ICD-10-CM

## 2025-06-13 LAB
ALBUMIN SERPL BCG-MCNC: 4.1 G/DL (ref 3.5–5.2)
ALP SERPL-CCNC: 71 U/L (ref 40–150)
ALT SERPL W P-5'-P-CCNC: 8 U/L (ref 0–50)
ANION GAP SERPL CALCULATED.3IONS-SCNC: 9 MMOL/L (ref 7–15)
AST SERPL W P-5'-P-CCNC: 21 U/L (ref 0–45)
BILIRUB SERPL-MCNC: 0.3 MG/DL
BUN SERPL-MCNC: 13.7 MG/DL (ref 8–23)
CALCIUM SERPL-MCNC: 9.8 MG/DL (ref 8.8–10.4)
CHLORIDE SERPL-SCNC: 101 MMOL/L (ref 98–107)
CREAT SERPL-MCNC: 0.8 MG/DL (ref 0.51–0.95)
EGFRCR SERPLBLD CKD-EPI 2021: 69 ML/MIN/1.73M2
ERYTHROCYTE [DISTWIDTH] IN BLOOD BY AUTOMATED COUNT: 13.2 % (ref 10–15)
GLUCOSE SERPL-MCNC: 89 MG/DL (ref 70–99)
HCO3 SERPL-SCNC: 30 MMOL/L (ref 22–29)
HCT VFR BLD AUTO: 40.8 % (ref 35–47)
HGB BLD-MCNC: 12.9 G/DL (ref 11.7–15.7)
MCH RBC QN AUTO: 28.7 PG (ref 26.5–33)
MCHC RBC AUTO-ENTMCNC: 31.6 G/DL (ref 31.5–36.5)
MCV RBC AUTO: 91 FL (ref 78–100)
PLATELET # BLD AUTO: 237 10E3/UL (ref 150–450)
POTASSIUM SERPL-SCNC: 4.3 MMOL/L (ref 3.4–5.3)
PROT SERPL-MCNC: 6.7 G/DL (ref 6.4–8.3)
RBC # BLD AUTO: 4.49 10E6/UL (ref 3.8–5.2)
SODIUM SERPL-SCNC: 140 MMOL/L (ref 135–145)
TSH SERPL DL<=0.005 MIU/L-ACNC: 4.66 UIU/ML (ref 0.3–4.2)
VIT B12 SERPL-MCNC: 472 PG/ML (ref 232–1245)
VIT D+METAB SERPL-MCNC: 56 NG/ML (ref 20–50)
WBC # BLD AUTO: 8.6 10E3/UL (ref 4–11)

## 2025-06-13 PROCEDURE — 82607 VITAMIN B-12: CPT | Mod: ORL

## 2025-06-13 PROCEDURE — 82306 VITAMIN D 25 HYDROXY: CPT | Mod: ORL

## 2025-06-13 PROCEDURE — 85027 COMPLETE CBC AUTOMATED: CPT | Mod: ORL

## 2025-06-13 PROCEDURE — 36415 COLL VENOUS BLD VENIPUNCTURE: CPT | Mod: ORL

## 2025-06-13 PROCEDURE — 80053 COMPREHEN METABOLIC PANEL: CPT | Mod: ORL

## 2025-06-13 PROCEDURE — 84443 ASSAY THYROID STIM HORMONE: CPT | Mod: ORL

## 2025-06-13 PROCEDURE — P9603 ONE-WAY ALLOW PRORATED MILES: HCPCS | Mod: ORL

## 2025-06-15 ENCOUNTER — LAB REQUISITION (OUTPATIENT)
Dept: LAB | Facility: CLINIC | Age: OVER 89
End: 2025-06-15
Payer: MEDICARE

## 2025-06-15 DIAGNOSIS — I48.20 CHRONIC ATRIAL FIBRILLATION, UNSPECIFIED (H): ICD-10-CM

## 2025-06-16 LAB
INR PPP: 3.2 (ref 0.85–1.15)
PROTHROMBIN TIME: 31.6 SECONDS (ref 11.8–14.8)

## 2025-06-16 PROCEDURE — 36415 COLL VENOUS BLD VENIPUNCTURE: CPT | Mod: ORL

## 2025-06-16 PROCEDURE — 85610 PROTHROMBIN TIME: CPT | Mod: ORL

## 2025-06-16 PROCEDURE — P9603 ONE-WAY ALLOW PRORATED MILES: HCPCS | Mod: ORL

## 2025-06-24 ENCOUNTER — LAB REQUISITION (OUTPATIENT)
Dept: LAB | Facility: CLINIC | Age: OVER 89
End: 2025-06-24
Payer: MEDICARE

## 2025-06-24 DIAGNOSIS — Z79.01 LONG TERM (CURRENT) USE OF ANTICOAGULANTS: ICD-10-CM

## 2025-06-25 LAB
INR PPP: 2.87 (ref 0.85–1.15)
PROTHROMBIN TIME: 29.1 SECONDS (ref 11.8–14.8)

## 2025-06-25 PROCEDURE — 85610 PROTHROMBIN TIME: CPT | Mod: ORL

## 2025-06-25 PROCEDURE — 36415 COLL VENOUS BLD VENIPUNCTURE: CPT | Mod: ORL

## 2025-06-25 PROCEDURE — P9603 ONE-WAY ALLOW PRORATED MILES: HCPCS | Mod: ORL

## 2025-06-27 ENCOUNTER — LAB REQUISITION (OUTPATIENT)
Dept: LAB | Facility: CLINIC | Age: OVER 89
End: 2025-06-27
Payer: MEDICARE

## 2025-06-27 DIAGNOSIS — I48.20 CHRONIC ATRIAL FIBRILLATION, UNSPECIFIED (H): ICD-10-CM

## 2025-06-30 ENCOUNTER — LAB REQUISITION (OUTPATIENT)
Dept: LAB | Facility: CLINIC | Age: OVER 89
End: 2025-06-30
Payer: MEDICARE

## 2025-06-30 DIAGNOSIS — I48.20 CHRONIC ATRIAL FIBRILLATION, UNSPECIFIED (H): ICD-10-CM

## 2025-07-02 LAB
INR PPP: 2.36 (ref 0.85–1.15)
PROTHROMBIN TIME: 25.1 SECONDS (ref 11.8–14.8)

## 2025-07-02 PROCEDURE — 36415 COLL VENOUS BLD VENIPUNCTURE: CPT | Mod: ORL

## 2025-07-02 PROCEDURE — 85610 PROTHROMBIN TIME: CPT | Mod: ORL

## 2025-07-02 PROCEDURE — P9603 ONE-WAY ALLOW PRORATED MILES: HCPCS | Mod: ORL

## 2025-07-14 ENCOUNTER — LAB REQUISITION (OUTPATIENT)
Dept: LAB | Facility: CLINIC | Age: OVER 89
End: 2025-07-14
Payer: MEDICARE

## 2025-07-14 DIAGNOSIS — I48.20 CHRONIC ATRIAL FIBRILLATION, UNSPECIFIED (H): ICD-10-CM

## 2025-07-16 LAB
INR PPP: 2.33 (ref 0.85–1.15)
PROTHROMBIN TIME: 24.9 SECONDS (ref 11.8–14.8)

## 2025-07-16 PROCEDURE — P9603 ONE-WAY ALLOW PRORATED MILES: HCPCS | Mod: ORL

## 2025-07-16 PROCEDURE — 85610 PROTHROMBIN TIME: CPT | Mod: ORL

## 2025-08-04 ENCOUNTER — LAB REQUISITION (OUTPATIENT)
Dept: LAB | Facility: CLINIC | Age: OVER 89
End: 2025-08-04
Payer: MEDICARE

## 2025-08-04 DIAGNOSIS — I48.20 CHRONIC ATRIAL FIBRILLATION, UNSPECIFIED (H): ICD-10-CM

## 2025-08-06 LAB
INR PPP: 2.22 (ref 0.85–1.15)
PROTHROMBIN TIME: 24 SECONDS (ref 11.8–14.8)

## 2025-08-06 PROCEDURE — 36415 COLL VENOUS BLD VENIPUNCTURE: CPT | Mod: ORL

## 2025-08-06 PROCEDURE — P9603 ONE-WAY ALLOW PRORATED MILES: HCPCS | Mod: ORL

## 2025-08-06 PROCEDURE — 85610 PROTHROMBIN TIME: CPT | Mod: ORL

## 2025-08-28 ENCOUNTER — LAB REQUISITION (OUTPATIENT)
Dept: LAB | Facility: CLINIC | Age: OVER 89
End: 2025-08-28
Payer: MEDICARE

## 2025-08-28 DIAGNOSIS — I50.30 UNSPECIFIED DIASTOLIC (CONGESTIVE) HEART FAILURE (H): ICD-10-CM

## 2025-08-28 DIAGNOSIS — G89.4 CHRONIC PAIN SYNDROME: ICD-10-CM

## 2025-08-28 DIAGNOSIS — N18.2 CHRONIC KIDNEY DISEASE, STAGE 2 (MILD): ICD-10-CM

## 2025-08-28 DIAGNOSIS — I48.20 CHRONIC ATRIAL FIBRILLATION, UNSPECIFIED (H): ICD-10-CM

## 2025-08-28 DIAGNOSIS — G44.221 CHRONIC TENSION-TYPE HEADACHE, INTRACTABLE: ICD-10-CM

## 2025-08-28 DIAGNOSIS — D64.9 ANEMIA, UNSPECIFIED: ICD-10-CM

## 2025-09-01 ENCOUNTER — LAB REQUISITION (OUTPATIENT)
Dept: LAB | Facility: CLINIC | Age: OVER 89
End: 2025-09-01
Payer: MEDICARE

## 2025-09-01 DIAGNOSIS — I48.20 CHRONIC ATRIAL FIBRILLATION, UNSPECIFIED (H): ICD-10-CM

## 2025-09-03 LAB
ANION GAP SERPL CALCULATED.3IONS-SCNC: 9 MMOL/L (ref 7–15)
BUN SERPL-MCNC: 16 MG/DL (ref 8–23)
CALCIUM SERPL-MCNC: 9.2 MG/DL (ref 8.8–10.4)
CHLORIDE SERPL-SCNC: 101 MMOL/L (ref 98–107)
CREAT SERPL-MCNC: 0.83 MG/DL (ref 0.51–0.95)
EGFRCR SERPLBLD CKD-EPI 2021: 66 ML/MIN/1.73M2
ERYTHROCYTE [DISTWIDTH] IN BLOOD BY AUTOMATED COUNT: 13.7 % (ref 10–15)
GLUCOSE SERPL-MCNC: 101 MG/DL (ref 70–99)
HCO3 SERPL-SCNC: 29 MMOL/L (ref 22–29)
HCT VFR BLD AUTO: 35.5 % (ref 35–47)
HGB BLD-MCNC: 11.2 G/DL (ref 11.7–15.7)
INR PPP: 2.15 (ref 0.85–1.15)
MCH RBC QN AUTO: 28.7 PG (ref 26.5–33)
MCHC RBC AUTO-ENTMCNC: 31.5 G/DL (ref 31.5–36.5)
MCV RBC AUTO: 91 FL (ref 78–100)
PLATELET # BLD AUTO: 217 10E3/UL (ref 150–450)
POTASSIUM SERPL-SCNC: 4 MMOL/L (ref 3.4–5.3)
PROTHROMBIN TIME: 23.4 SECONDS (ref 11.8–14.8)
RBC # BLD AUTO: 3.9 10E6/UL (ref 3.8–5.2)
SODIUM SERPL-SCNC: 139 MMOL/L (ref 135–145)
TSH SERPL DL<=0.005 MIU/L-ACNC: 3.17 UIU/ML (ref 0.3–4.2)
WBC # BLD AUTO: 5.55 10E3/UL (ref 4–11)